# Patient Record
Sex: FEMALE | Race: BLACK OR AFRICAN AMERICAN | NOT HISPANIC OR LATINO
[De-identification: names, ages, dates, MRNs, and addresses within clinical notes are randomized per-mention and may not be internally consistent; named-entity substitution may affect disease eponyms.]

---

## 2019-10-25 ENCOUNTER — TRANSCRIPTION ENCOUNTER (OUTPATIENT)
Age: 29
End: 2019-10-25

## 2020-01-02 ENCOUNTER — TRANSCRIPTION ENCOUNTER (OUTPATIENT)
Age: 30
End: 2020-01-02

## 2020-05-10 ENCOUNTER — RESULT REVIEW (OUTPATIENT)
Age: 30
End: 2020-05-10

## 2020-05-10 ENCOUNTER — TRANSCRIPTION ENCOUNTER (OUTPATIENT)
Age: 30
End: 2020-05-10

## 2020-05-10 ENCOUNTER — INPATIENT (INPATIENT)
Facility: HOSPITAL | Age: 30
LOS: 0 days | Discharge: HOME | End: 2020-05-11
Attending: OBSTETRICS & GYNECOLOGY | Admitting: OBSTETRICS & GYNECOLOGY
Payer: COMMERCIAL

## 2020-05-10 VITALS
RESPIRATION RATE: 16 BRPM | DIASTOLIC BLOOD PRESSURE: 81 MMHG | SYSTOLIC BLOOD PRESSURE: 141 MMHG | HEART RATE: 97 BPM | TEMPERATURE: 98 F

## 2020-05-10 DIAGNOSIS — Z3A.00 WEEKS OF GESTATION OF PREGNANCY NOT SPECIFIED: ICD-10-CM

## 2020-05-10 DIAGNOSIS — O26.899 OTHER SPECIFIED PREGNANCY RELATED CONDITIONS, UNSPECIFIED TRIMESTER: ICD-10-CM

## 2020-05-10 DIAGNOSIS — Z98.890 OTHER SPECIFIED POSTPROCEDURAL STATES: Chronic | ICD-10-CM

## 2020-05-10 LAB
ALBUMIN SERPL ELPH-MCNC: 3.9 G/DL — SIGNIFICANT CHANGE UP (ref 3.3–5)
ALP SERPL-CCNC: 60 U/L — SIGNIFICANT CHANGE UP (ref 40–120)
ALT FLD-CCNC: 28 U/L — SIGNIFICANT CHANGE UP (ref 4–33)
ANION GAP SERPL CALC-SCNC: 17 MMO/L — HIGH (ref 7–14)
AST SERPL-CCNC: 21 U/L — SIGNIFICANT CHANGE UP (ref 4–32)
BASOPHILS # BLD AUTO: 0.04 K/UL — SIGNIFICANT CHANGE UP (ref 0–0.2)
BASOPHILS NFR BLD AUTO: 0.2 % — SIGNIFICANT CHANGE UP (ref 0–2)
BILIRUB SERPL-MCNC: < 0.2 MG/DL — LOW (ref 0.2–1.2)
BLD GP AB SCN SERPL QL: NEGATIVE — SIGNIFICANT CHANGE UP
BUN SERPL-MCNC: 6 MG/DL — LOW (ref 7–23)
CALCIUM SERPL-MCNC: 9.9 MG/DL — SIGNIFICANT CHANGE UP (ref 8.4–10.5)
CHLORIDE SERPL-SCNC: 103 MMOL/L — SIGNIFICANT CHANGE UP (ref 98–107)
CO2 SERPL-SCNC: 19 MMOL/L — LOW (ref 22–31)
CREAT ?TM UR-MCNC: 97.7 MG/DL — SIGNIFICANT CHANGE UP
CREAT SERPL-MCNC: 0.79 MG/DL — SIGNIFICANT CHANGE UP (ref 0.5–1.3)
EOSINOPHIL # BLD AUTO: 0.21 K/UL — SIGNIFICANT CHANGE UP (ref 0–0.5)
EOSINOPHIL NFR BLD AUTO: 1.2 % — SIGNIFICANT CHANGE UP (ref 0–6)
FIBRINOGEN PPP-MCNC: 675 MG/DL — HIGH (ref 300–520)
GLUCOSE SERPL-MCNC: 106 MG/DL — HIGH (ref 70–99)
HCT VFR BLD CALC: 40.5 % — SIGNIFICANT CHANGE UP (ref 34.5–45)
HGB BLD-MCNC: 13.9 G/DL — SIGNIFICANT CHANGE UP (ref 11.5–15.5)
IMM GRANULOCYTES NFR BLD AUTO: 0.5 % — SIGNIFICANT CHANGE UP (ref 0–1.5)
LDH SERPL L TO P-CCNC: 144 U/L — SIGNIFICANT CHANGE UP (ref 135–225)
LYMPHOCYTES # BLD AUTO: 1.89 K/UL — SIGNIFICANT CHANGE UP (ref 1–3.3)
LYMPHOCYTES # BLD AUTO: 11 % — LOW (ref 13–44)
MCHC RBC-ENTMCNC: 27 PG — SIGNIFICANT CHANGE UP (ref 27–34)
MCHC RBC-ENTMCNC: 34.3 % — SIGNIFICANT CHANGE UP (ref 32–36)
MCV RBC AUTO: 78.6 FL — LOW (ref 80–100)
MONOCYTES # BLD AUTO: 1.08 K/UL — HIGH (ref 0–0.9)
MONOCYTES NFR BLD AUTO: 6.3 % — SIGNIFICANT CHANGE UP (ref 2–14)
NEUTROPHILS # BLD AUTO: 13.82 K/UL — HIGH (ref 1.8–7.4)
NEUTROPHILS NFR BLD AUTO: 80.8 % — HIGH (ref 43–77)
NRBC # FLD: 0 K/UL — SIGNIFICANT CHANGE UP (ref 0–0)
PLATELET # BLD AUTO: 355 K/UL — SIGNIFICANT CHANGE UP (ref 150–400)
PMV BLD: 9.4 FL — SIGNIFICANT CHANGE UP (ref 7–13)
POTASSIUM SERPL-MCNC: 3.7 MMOL/L — SIGNIFICANT CHANGE UP (ref 3.5–5.3)
POTASSIUM SERPL-SCNC: 3.7 MMOL/L — SIGNIFICANT CHANGE UP (ref 3.5–5.3)
PROT SERPL-MCNC: 7.4 G/DL — SIGNIFICANT CHANGE UP (ref 6–8.3)
PROT UR-MCNC: 12.7 MG/DL — SIGNIFICANT CHANGE UP
RBC # BLD: 5.15 M/UL — SIGNIFICANT CHANGE UP (ref 3.8–5.2)
RBC # FLD: 13.8 % — SIGNIFICANT CHANGE UP (ref 10.3–14.5)
RH IG SCN BLD-IMP: POSITIVE — SIGNIFICANT CHANGE UP
RH IG SCN BLD-IMP: POSITIVE — SIGNIFICANT CHANGE UP
SODIUM SERPL-SCNC: 139 MMOL/L — SIGNIFICANT CHANGE UP (ref 135–145)
URATE SERPL-MCNC: 4.1 MG/DL — SIGNIFICANT CHANGE UP (ref 2.5–7)
WBC # BLD: 17.13 K/UL — HIGH (ref 3.8–10.5)
WBC # FLD AUTO: 17.13 K/UL — HIGH (ref 3.8–10.5)

## 2020-05-10 RX ORDER — SODIUM CHLORIDE 9 MG/ML
1000 INJECTION, SOLUTION INTRAVENOUS
Refills: 0 | Status: DISCONTINUED | OUTPATIENT
Start: 2020-05-10 | End: 2020-05-11

## 2020-05-10 RX ORDER — OXYTOCIN 10 UNIT/ML
333.33 VIAL (ML) INJECTION
Qty: 20 | Refills: 0 | Status: DISCONTINUED | OUTPATIENT
Start: 2020-05-10 | End: 2020-05-10

## 2020-05-10 RX ORDER — INFLUENZA VIRUS VACCINE 15; 15; 15; 15 UG/.5ML; UG/.5ML; UG/.5ML; UG/.5ML
0.5 SUSPENSION INTRAMUSCULAR ONCE
Refills: 0 | Status: DISCONTINUED | OUTPATIENT
Start: 2020-05-10 | End: 2020-05-11

## 2020-05-10 NOTE — OB PROVIDER TRIAGE NOTE - NSOBPROVIDERNOTE_OBGYN_ALL_OB_FT
Dr. Rosado's pt. is a 29y/o  EGA 19.2wks reports of spotting of bright red blood since 1130am and irregular abdominal cramping. Pt. states around 1830pm she noticed an increase in bright red blood. Pt. states she had intercourse 48hrs ago.     AP: Denies  Blood Type: O Neg.   Medical Hx: Denies  Surgical Hx: D&C 2019  OBGYN Hx:   TOP 2019 with D&C  Meds: PNV  NKDA    Assessment/Plan  BP: 141/81, HR: 97, Temp: 36.9  Abdomen soft nontender  TAS: Cephalic presentation, anterior placenta, FHR:165bpm, MVP:4.9, EFW:299.49gms  Speculum: Bulging membranes 2-3cm dilated   No contractions on TOCO    Discussed findings with Dr. Rawls and Dr. Johnston  -Admit to L&D  -Routine labs, HELLP, TORCH panel, COVID   -Plan for induction

## 2020-05-10 NOTE — CHART NOTE - NSCHARTNOTEFT_GEN_A_CORE
[facility] OB Chart Note    Vaginal cytotec 400mcg buccal + 400mcg vaginal placed.  SVE 3/50/-3, bulging membranes in vagina.    Vital Signs Last 24 Hours  T(C): 37 (05-10-20 @ 20:48), Max: 37 (05-10-20 @ 20:48)  HR: 80 (05-10-20 @ 23:00) (80 - 108)  BP: 133/72 (05-10-20 @ 23:00) (129/75 - 142/87)  RR: 18 (05-10-20 @ 20:48) (16 - 18)      Labs:                        13.9   17.13 )-----------( 355      ( 10 May 2020 21:40 )             40.5   baso 0.2    eos 1.2    imm gran 0.5    lymph 11.0   mono 6.3    poly 80.8     05-10    139  |  103  |  6<L>  ----------------------------<  106<H>  3.7   |  19<L>  |  0.79    Ca    9.9      10 May 2020 21:40    TPro  7.4  /  Alb  3.9  /  TBili  < 0.2<L>  /  DBili  x   /  AST  21  /  ALT  28  /  AlkPhos  60  05-10    Blood Type: O Positive    Plan   - hellp labs wnl, f/u urine P/C ratio   - leukocytosis with left shift, f/u TORCH labs, urine culture   - continue IOL w/ vaginal cytotec 400mcg vaginal q3h   - epi prn    d/w Dr. Curly Johnston MD PGY3

## 2020-05-10 NOTE — OB PROVIDER TRIAGE NOTE - NSHPPHYSICALEXAM_GEN_ALL_CORE
BP: 141/81, HR: 97, Temp: 36.9  Abdomen soft nontender  TAS: Cephalic presentation, anterior placenta, FHR:165bpm, MVP:4.9, EFW:299.49gms  Speculum: Bulging membranes 2-3cm dilated   No contractions on TOCO

## 2020-05-10 NOTE — OB PROVIDER H&P - ASSESSMENT
Dr. Rosado's pt. is a 29y/o  EGA 19.2wks reports of spotting of bright red blood since 1130am and irregular abdominal cramping. Pt. states around 1830pm she noticed an increase in bright red blood. Pt. states she had intercourse 48hrs ago. Pt. states she has not felt fetal movement yet.      AP: Denies  Blood Type: O Neg. (as per pt)  Medical Hx: Denies  Surgical Hx: D&C 2019  OBGYN Hx:   TOP 2019 with D&C  Meds: PNV  NKDA    Assessment/Plan  BP: 141/81, HR: 97, Temp: 36.9  Abdomen soft nontender  TAS: Cephalic presentation, anterior placenta, FHR:165bpm, MVP:4.9, EFW:299.49gms  Speculum: Bulging membranes 2-3cm dilated   No contractions on TOCO    Discussed findings with Dr. Rawls and Dr. Johnston  -Admit to L&D  -Routine labs, HELLP, TORCH panel, COVID   -Plan for induction

## 2020-05-10 NOTE — OB PROVIDER TRIAGE NOTE - HISTORY OF PRESENT ILLNESS
Dr. Rosado's pt. is a 31y/o  EGA 19.2wks reports of spotting of bright red blood since 1130am and irregular abdominal cramping. Pt. states around 1830pm she noticed an increase in bright red blood. Pt. states she had intercourse 48hrs ago.

## 2020-05-10 NOTE — CHART NOTE - NSCHARTNOTEFT_GEN_A_CORE
[facility]3 OB Triage Chart Note    Pt seen and evaluated at bedside.  Per Triage PA pt p/w cramping and spotting throuhgout the day and found to have SSE w/ 2-3cm dilation and bulging membranes.  Pt denies any fevers, chills, N/V, abd pain, sick contacts.      ICU Vital Signs Last 24 Hrs  T(C): 36.9 (10 May 2020 20:04), Max: 36.9 (10 May 2020 19:40)  T(F): 98.42 (10 May 2020 20:04), Max: 98.42 (10 May 2020 20:04)  HR: 82 (10 May 2020 20:36) (82 - 108)  BP: 129/75 (10 May 2020 20:36) (129/75 - 142/87)  RR: 16 (10 May 2020 19:40) (16 - 16)    Gen nad, a&ox3  CV rrr  PULM ctab  ABD soft nt gravid  SVE deferred    A/P: 30y  @ 19w2d p/w cramping and VB found to be dilated 2-3cm w/ bulging membranes.  Pt elects to proceed with IOL.  -admit to L&D  -routine labs, T&S, HELLP labs for elevated mild range BP, COVID swab, TORCH labs  -Clears, LR@125  -anesthesia consult prn  -cytotec 400 buccal + 400 vaginal followed by 400 vaginal q3h for IOL  -venodynes for vte ppx    d/w Dr. Curly Johnston MD PGY3

## 2020-05-10 NOTE — OB PROVIDER H&P - ATTENDING COMMENTS
29y/o  EGA @ 19.2 weeks EGA c/o of spotting of bright red blood since 1130am and irregular abdominal cramping. Cervix dilated 2-3 cm by visual examination with bulging membranes. Dx: Inevitable . Discussed management options and patient elected induction of labor.

## 2020-05-10 NOTE — OB PROVIDER H&P - HISTORY OF PRESENT ILLNESS
Dr. Rosado's pt. is a 31y/o  EGA 19.2wks reports of spotting of bright red blood since 1130am and irregular abdominal cramping. Pain 5/10. Pt. states around 1830pm she noticed an increase in bright red blood. Pt. states she had intercourse 48hrs ago. Pt. states she has not felt fetal movement.

## 2020-05-11 VITALS
HEART RATE: 83 BPM | RESPIRATION RATE: 17 BRPM | TEMPERATURE: 99 F | SYSTOLIC BLOOD PRESSURE: 120 MMHG | DIASTOLIC BLOOD PRESSURE: 58 MMHG

## 2020-05-11 LAB
C TRACH RRNA SPEC QL NAA+PROBE: SIGNIFICANT CHANGE UP
HSV1 IGG SER-ACNC: 6.33 INDEX — HIGH
HSV1 IGG SERPL QL IA: POSITIVE — HIGH
HSV2 IGG FLD-ACNC: 0.14 INDEX — SIGNIFICANT CHANGE UP
HSV2 IGG SERPL QL IA: NEGATIVE — SIGNIFICANT CHANGE UP
N GONORRHOEA RRNA SPEC QL NAA+PROBE: SIGNIFICANT CHANGE UP
RBC # BLD FETUS AUTO: 0.1 — SIGNIFICANT CHANGE UP
RUBV IGG SER-ACNC: 2.1 INDEX — SIGNIFICANT CHANGE UP
RUBV IGG SER-IMP: POSITIVE — SIGNIFICANT CHANGE UP
SARS-COV-2 RNA SPEC QL NAA+PROBE: SIGNIFICANT CHANGE UP
T GONDII IGG SER QL: <3 IU/ML — SIGNIFICANT CHANGE UP
T GONDII IGG SER QL: NEGATIVE — SIGNIFICANT CHANGE UP
T GONDII IGM SER QL: <3 AU/ML — SIGNIFICANT CHANGE UP
T GONDII IGM SER QL: NEGATIVE — SIGNIFICANT CHANGE UP
T PALLIDUM AB TITR SER: NEGATIVE — SIGNIFICANT CHANGE UP

## 2020-05-11 PROCEDURE — 59840 INDUCED ABORTION D&C: CPT | Mod: GC

## 2020-05-11 PROCEDURE — 88305 TISSUE EXAM BY PATHOLOGIST: CPT | Mod: 26

## 2020-05-11 RX ORDER — DIPHENHYDRAMINE HCL 50 MG
25 CAPSULE ORAL EVERY 6 HOURS
Refills: 0 | Status: DISCONTINUED | OUTPATIENT
Start: 2020-05-11 | End: 2020-05-11

## 2020-05-11 RX ORDER — ERTAPENEM SODIUM 1 G/1
1000 INJECTION, POWDER, LYOPHILIZED, FOR SOLUTION INTRAMUSCULAR; INTRAVENOUS ONCE
Refills: 0 | Status: COMPLETED | OUTPATIENT
Start: 2020-05-11 | End: 2020-05-11

## 2020-05-11 RX ORDER — SODIUM CHLORIDE 9 MG/ML
3 INJECTION INTRAMUSCULAR; INTRAVENOUS; SUBCUTANEOUS EVERY 8 HOURS
Refills: 0 | Status: DISCONTINUED | OUTPATIENT
Start: 2020-05-11 | End: 2020-05-11

## 2020-05-11 RX ORDER — KETOROLAC TROMETHAMINE 30 MG/ML
30 SYRINGE (ML) INJECTION ONCE
Refills: 0 | Status: DISCONTINUED | OUTPATIENT
Start: 2020-05-11 | End: 2020-05-11

## 2020-05-11 RX ORDER — TETANUS TOXOID, REDUCED DIPHTHERIA TOXOID AND ACELLULAR PERTUSSIS VACCINE, ADSORBED 5; 2.5; 8; 8; 2.5 [IU]/.5ML; [IU]/.5ML; UG/.5ML; UG/.5ML; UG/.5ML
0.5 SUSPENSION INTRAMUSCULAR ONCE
Refills: 0 | Status: DISCONTINUED | OUTPATIENT
Start: 2020-05-11 | End: 2020-05-11

## 2020-05-11 RX ORDER — MORPHINE SULFATE 50 MG/1
4 CAPSULE, EXTENDED RELEASE ORAL ONCE
Refills: 0 | Status: DISCONTINUED | OUTPATIENT
Start: 2020-05-11 | End: 2020-05-11

## 2020-05-11 RX ORDER — HYDROCORTISONE 1 %
1 OINTMENT (GRAM) TOPICAL EVERY 6 HOURS
Refills: 0 | Status: DISCONTINUED | OUTPATIENT
Start: 2020-05-11 | End: 2020-05-11

## 2020-05-11 RX ORDER — BENZOCAINE 10 %
1 GEL (GRAM) MUCOUS MEMBRANE EVERY 6 HOURS
Refills: 0 | Status: DISCONTINUED | OUTPATIENT
Start: 2020-05-11 | End: 2020-05-11

## 2020-05-11 RX ORDER — AER TRAVELER 0.5 G/1
1 SOLUTION RECTAL; TOPICAL EVERY 4 HOURS
Refills: 0 | Status: DISCONTINUED | OUTPATIENT
Start: 2020-05-11 | End: 2020-05-11

## 2020-05-11 RX ORDER — OXYCODONE HYDROCHLORIDE 5 MG/1
5 TABLET ORAL ONCE
Refills: 0 | Status: DISCONTINUED | OUTPATIENT
Start: 2020-05-11 | End: 2020-05-11

## 2020-05-11 RX ORDER — MAGNESIUM HYDROXIDE 400 MG/1
30 TABLET, CHEWABLE ORAL
Refills: 0 | Status: DISCONTINUED | OUTPATIENT
Start: 2020-05-11 | End: 2020-05-11

## 2020-05-11 RX ORDER — IBUPROFEN 200 MG
600 TABLET ORAL EVERY 6 HOURS
Refills: 0 | Status: DISCONTINUED | OUTPATIENT
Start: 2020-05-11 | End: 2020-05-11

## 2020-05-11 RX ORDER — LANOLIN
1 OINTMENT (GRAM) TOPICAL EVERY 6 HOURS
Refills: 0 | Status: DISCONTINUED | OUTPATIENT
Start: 2020-05-11 | End: 2020-05-11

## 2020-05-11 RX ORDER — SIMETHICONE 80 MG/1
80 TABLET, CHEWABLE ORAL EVERY 4 HOURS
Refills: 0 | Status: DISCONTINUED | OUTPATIENT
Start: 2020-05-11 | End: 2020-05-11

## 2020-05-11 RX ORDER — PRAMOXINE HYDROCHLORIDE 150 MG/15G
1 AEROSOL, FOAM RECTAL EVERY 4 HOURS
Refills: 0 | Status: DISCONTINUED | OUTPATIENT
Start: 2020-05-11 | End: 2020-05-11

## 2020-05-11 RX ORDER — DIBUCAINE 1 %
1 OINTMENT (GRAM) RECTAL EVERY 6 HOURS
Refills: 0 | Status: DISCONTINUED | OUTPATIENT
Start: 2020-05-11 | End: 2020-05-11

## 2020-05-11 RX ORDER — OXYTOCIN 10 UNIT/ML
333.33 VIAL (ML) INJECTION
Qty: 20 | Refills: 0 | Status: DISCONTINUED | OUTPATIENT
Start: 2020-05-11 | End: 2020-05-11

## 2020-05-11 RX ORDER — DIPHENOXYLATE HCL/ATROPINE 2.5-.025MG
2 TABLET ORAL ONCE
Refills: 0 | Status: DISCONTINUED | OUTPATIENT
Start: 2020-05-11 | End: 2020-05-11

## 2020-05-11 RX ORDER — CARBOPROST TROMETHAMINE 250 UG/ML
250 INJECTION, SOLUTION INTRAMUSCULAR
Refills: 0 | Status: COMPLETED | OUTPATIENT
Start: 2020-05-11 | End: 2020-05-11

## 2020-05-11 RX ORDER — OXYCODONE HYDROCHLORIDE 5 MG/1
5 TABLET ORAL
Refills: 0 | Status: DISCONTINUED | OUTPATIENT
Start: 2020-05-11 | End: 2020-05-11

## 2020-05-11 RX ORDER — ACETAMINOPHEN 500 MG
975 TABLET ORAL
Refills: 0 | Status: DISCONTINUED | OUTPATIENT
Start: 2020-05-11 | End: 2020-05-11

## 2020-05-11 RX ADMIN — MORPHINE SULFATE 4 MILLIGRAM(S): 50 CAPSULE, EXTENDED RELEASE ORAL at 00:25

## 2020-05-11 RX ADMIN — Medication 1000 MILLIUNIT(S)/MIN: at 02:20

## 2020-05-11 RX ADMIN — Medication 2 TABLET(S): at 01:50

## 2020-05-11 RX ADMIN — SODIUM CHLORIDE 3 MILLILITER(S): 9 INJECTION INTRAMUSCULAR; INTRAVENOUS; SUBCUTANEOUS at 06:36

## 2020-05-11 RX ADMIN — CARBOPROST TROMETHAMINE 250 MICROGRAM(S): 250 INJECTION, SOLUTION INTRAMUSCULAR at 02:25

## 2020-05-11 RX ADMIN — CARBOPROST TROMETHAMINE 250 MICROGRAM(S): 250 INJECTION, SOLUTION INTRAMUSCULAR at 01:50

## 2020-05-11 RX ADMIN — Medication 30 MILLIGRAM(S): at 03:00

## 2020-05-11 RX ADMIN — CARBOPROST TROMETHAMINE 250 MICROGRAM(S): 250 INJECTION, SOLUTION INTRAMUSCULAR at 02:07

## 2020-05-11 RX ADMIN — ERTAPENEM SODIUM 120 MILLIGRAM(S): 1 INJECTION, POWDER, LYOPHILIZED, FOR SOLUTION INTRAMUSCULAR; INTRAVENOUS at 02:56

## 2020-05-11 NOTE — DISCHARGE NOTE OB - PROVIDER TOKENS
FREE:[LAST:[[facility]],PHONE:[phone]],FAX:[(   )    -],ADDRESS:[[address] [place] [address] [address], [place] [address].]]

## 2020-05-11 NOTE — DISCHARGE NOTE OB - HOSPITAL COURSE
Pt was admitted with cramping and vaginal bleeding and found to have bulging membranes with cervix dilated 2-3cm and leukocytosis.  Pt underwent cytotec induction of labor and had spontaneous delivery of nonviable male infant.  She received 1g IV invanz for manual extraction of placenta.  EBL 150cc.  Placental cultures as well as TORCH labs were sent and placenta was sent to pathology.      On the day of discharge the patient was stable and afebrile, voiding spontaneously, tolerating regular diet, ambulating at baseline and had pain well controlled with oral pain medications.  Patient to have close follow up with [facility] outpatient. Pt was admitted with cramping and vaginal bleeding and found to have bulging membranes with cervix dilated 2-3cm and leukocytosis.  Pt underwent cytotec induction of labor and had spontaneous delivery of nonviable male infant.  She received 1g IV invanz for manual extraction of placenta.  EBL 150cc.  Placental cultures as well as TORCH labs were sent and placenta was sent to pathology.  During her admission the pt had mild-range BP w/o ssx of PEC, concerning for previously undiagnosed chronic hypertension.  HELLP labs were wnl and P/C = 0.1.  Pt was advised to follow up with her PCP w/in 1 week for monitoring and treatment of chronic hypertension.  On the day of discharge the patient was stable and afebrile, voiding spontaneously, tolerating regular diet, ambulating at baseline and had pain well controlled with oral pain medications.  Patient to have close follow up with [facility] outpatient.

## 2020-05-11 NOTE — OB PROVIDER DELIVERY SUMMARY - NSPROVIDERDELIVERYNOTE_OBGYN_ALL_OB_FT
.  .  Attending Attestation:  I was involved in the management of the patient. I discussed the case with the resident and agree with the findings and plan as documented in the resident’s note.  Martin Rawls M.D.  of nonviable male infant.  Placenta delivered spontaneously, retained membranes was suspected and hemabate 250mg IM x3 was given and bimanual exam revealed no retained products of conception.  Bedside sonogram showed thin endometrial stripe and no color doppler flow within endometrium.  No lacerations.  .  Count correct.    L Preston FIGUEROA PGY3      Attending Attestation:  I was involved in the management of the patient. I discussed the case with the resident and agree with the findings and plan as documented in the resident’s note.  Martin Rawls M.D.

## 2020-05-11 NOTE — DISCHARGE NOTE OB - CARE PROVIDER_API CALL
[facility],   [address], [place] [address].  Phone: [phone]  Fax: (   )    -  Follow Up Time:

## 2020-05-11 NOTE — DISCHARGE NOTE OB - ADDITIONAL INSTRUCTIONS
Follow up with [facility] at [address], [place] [address].  Call the office at [phone] for your appointment. Follow up with [facility] at [address] [facility] [facility] [address], [place] [address].  Call the office at [phone] for your appointment.    Follow up with your primary care doctor within 1 week for monitoring and treatment of high blood pressures.

## 2020-05-11 NOTE — CHART NOTE - NSCHARTNOTEFT_GEN_A_CORE
31 yo  PPD#0 s/ VD of IUFD at 30vvc7s seen and evaluated at bedside. Pt reports she is feeling well.  Tolerating PO intake. Ambulating and voiding spontaneously.  Describes mild cramping but no pain.      VS  T(C): 36.9 (20 @ 06:05)  HR: 76 (20 @ 06:30)  BP: 106/53 (20 @ 06:30)  RR: 17 (20 @ 06:30)  SpO2: --    Gen: NAD  Abd: soft, non-tender, firm uterus  FU: mild bleeding on pad    31 yo  PPD#0 s/p VD of IUFD, patient is stable and doing well.  -increase ambulation  -pt counseled on what to expect as far as pain, bleeding, and follow up  -placental pathology to be followed up   -discharge planning    Darlene Galloway PGY1

## 2020-05-11 NOTE — DISCHARGE NOTE OB - CARE PLAN
Principal Discharge DX:	Stillbirth with normal delivery  Goal:	Wellness  Assessment and plan of treatment:	1. Nothing in the vagina for 6 weeks postpartum including no tampons, no douching, no tub baths and no intercourse.  2. Please call the office or go to the Emergency Room if you have any of the following: fever over 100.4F, pain not controlled by oral pain medications, difficulty urinating, severe vaginal bleeding more than 1 pad per hour, or for any other concerns.  3.  Follow up with your OBGYN in 6 weeks for postop checkup.  Call the office for your appointment. Principal Discharge DX:	Stillbirth with normal delivery  Goal:	Wellness  Assessment and plan of treatment:	1. Nothing in the vagina for 6 weeks postpartum including no tampons, no douching, no tub baths and no intercourse.  2. Please call the office or go to the Emergency Room if you have any of the following: fever over 100.4F, pain not controlled by oral pain medications, difficulty urinating, severe vaginal bleeding more than 1 pad per hour, or for any other concerns.  3.  Follow up with your OBGYN in 6 weeks for postop checkup.  Call the office for your appointment.  Secondary Diagnosis:	Hypertension affecting pregnancy in second trimester  Goal:	Blood pressure control.  Assessment and plan of treatment:	1.  Your blood pressures were mildly elevated during your hospital stay.  It is important to follow up with your Primary Care Provider within 1 week for monitoring of your blood pressure.    2.  Please purchase a blood pressure cuff and check your blood pressure twice daily.  If you blood pressure exceeds 150/100, or if you have severe headaches, visual changes, shortness of breath, chest pain, or upper abdominal pain, call your OBGYN or your Primary Care Provider or go to the Emergency Room.

## 2020-05-11 NOTE — DISCHARGE NOTE OB - PATIENT PORTAL LINK FT
You can access the FollowMyHealth Patient Portal offered by [facility] by registering at the following website: [email or web address]. By joining [employer]’s FollowMyHealth portal, you will also be able to view your health information using other applications (apps) compatible with our system.

## 2020-05-11 NOTE — CHART NOTE - NSCHARTNOTEFT_GEN_A_CORE
[facility] OB Chart Note    Demise paperwork filled out with pt.  She does not want cytogenetics or autopsy performed; she desires hospital burial of remains.      Pt feeling increased rectal pressure/pain.  SROM w/ clear fluids.  Requesting pain meds.  SVE 4-5/80/-1    Plan  -morphine 4x4  -continue IOL w/ cytotec    d/w Dr. Curly Johnston MD PGY3

## 2020-05-11 NOTE — DISCHARGE NOTE OB - PLAN OF CARE
Wellness 1. Nothing in the vagina for 6 weeks postpartum including no tampons, no douching, no tub baths and no intercourse.  2. Please call the office or go to the Emergency Room if you have any of the following: fever over 100.4F, pain not controlled by oral pain medications, difficulty urinating, severe vaginal bleeding more than 1 pad per hour, or for any other concerns.  3.  Follow up with your OBGYN in 6 weeks for postop checkup.  Call the office for your appointment. Blood pressure control. 1.  Your blood pressures were mildly elevated during your hospital stay.  It is important to follow up with your Primary Care Provider within 1 week for monitoring of your blood pressure.    2.  Please purchase a blood pressure cuff and check your blood pressure twice daily.  If you blood pressure exceeds 150/100, or if you have severe headaches, visual changes, shortness of breath, chest pain, or upper abdominal pain, call your OBGYN or your Primary Care Provider or go to the Emergency Room.

## 2020-05-11 NOTE — DISCHARGE NOTE OB - MEDICATION SUMMARY - MEDICATIONS TO TAKE
I will START or STAY ON the medications listed below when I get home from the hospital:    Tylenol Extra Strength 500 mg oral tablet  -- 2 tab(s) by mouth every 6 hours  -- Indication: For pain    ibuprofen 200 mg oral tablet  -- 3 tab(s) by mouth every 6 hours  -- Indication: For pain

## 2020-05-12 LAB — MEV IGM SER-ACNC: <20 AU/ML — SIGNIFICANT CHANGE UP (ref 0–19.9)

## 2020-05-13 LAB
HSV1 AB FLD QL: SIGNIFICANT CHANGE UP TITER
HSV2 AB FLD-ACNC: SIGNIFICANT CHANGE UP TITER

## 2020-05-16 LAB
CULTURE RESULTS: SIGNIFICANT CHANGE UP
CULTURE RESULTS: SIGNIFICANT CHANGE UP
SPECIMEN SOURCE: SIGNIFICANT CHANGE UP
SPECIMEN SOURCE: SIGNIFICANT CHANGE UP

## 2020-05-26 NOTE — OB RN TRIAGE NOTE - SUICIDE SCREENING QUESTION 1
Spoke with pt via phone.     Pt calling to schedule follow up to discuss neuropsychology results. Results not available yet. Message was sent on to Neuropsychology on 5/22/20 that pt calling and checking on status.     Will watch for results.    No

## 2020-07-23 NOTE — OB RN TRIAGE NOTE - NS PRO TALK SOMEONE YN
enalapril 20 mg oral tablet: 1 tab(s) orally once a day  enalapril 20 mg oral tablet: 1 tab(s) orally once a day metoprolol succinate 25 mg oral tablet, extended release: 1 tab(s) orally once a day metoprolol succinate 25 mg oral tablet, extended release: 1 tab(s) orally once a day  ondansetron 4 mg oral tablet: 1 tab(s) orally 3 times a day, As needed, Nausea  oxycodone-acetaminophen 5 mg-325 mg oral tablet: 0.5 tab(s) orally every 6 hours, As Needed for pain MDD:2 tablets no

## 2020-11-15 ENCOUNTER — INPATIENT (INPATIENT)
Facility: HOSPITAL | Age: 30
LOS: 0 days | Discharge: HOME | End: 2020-11-16
Attending: OBSTETRICS & GYNECOLOGY | Admitting: OBSTETRICS & GYNECOLOGY
Payer: COMMERCIAL

## 2020-11-15 ENCOUNTER — EMERGENCY (EMERGENCY)
Facility: HOSPITAL | Age: 30
LOS: 1 days | Discharge: STILL ADMITTED OR ADMINISTRATIVE | End: 2020-11-15
Admitting: EMERGENCY MEDICINE

## 2020-11-15 ENCOUNTER — TRANSCRIPTION ENCOUNTER (OUTPATIENT)
Age: 30
End: 2020-11-15

## 2020-11-15 ENCOUNTER — RESULT REVIEW (OUTPATIENT)
Age: 30
End: 2020-11-15

## 2020-11-15 VITALS — HEIGHT: 63 IN | WEIGHT: 156.97 LBS

## 2020-11-15 VITALS
HEIGHT: 63 IN | OXYGEN SATURATION: 100 % | RESPIRATION RATE: 18 BRPM | TEMPERATURE: 98 F | DIASTOLIC BLOOD PRESSURE: 87 MMHG | SYSTOLIC BLOOD PRESSURE: 142 MMHG | HEART RATE: 86 BPM

## 2020-11-15 DIAGNOSIS — Z98.890 OTHER SPECIFIED POSTPROCEDURAL STATES: Chronic | ICD-10-CM

## 2020-11-15 DIAGNOSIS — O34.32 MATERNAL CARE FOR CERVICAL INCOMPETENCE, SECOND TRIMESTER: ICD-10-CM

## 2020-11-15 DIAGNOSIS — O26.899 OTHER SPECIFIED PREGNANCY RELATED CONDITIONS, UNSPECIFIED TRIMESTER: ICD-10-CM

## 2020-11-15 DIAGNOSIS — O34.30 MATERNAL CARE FOR CERVICAL INCOMPETENCE, UNSPECIFIED TRIMESTER: Chronic | ICD-10-CM

## 2020-11-15 DIAGNOSIS — Z3A.00 WEEKS OF GESTATION OF PREGNANCY NOT SPECIFIED: ICD-10-CM

## 2020-11-15 LAB
APPEARANCE UR: CLEAR — SIGNIFICANT CHANGE UP
APPEARANCE UR: CLEAR — SIGNIFICANT CHANGE UP
APTT BLD: 37.1 SEC — HIGH (ref 27–36.3)
BACTERIA # UR AUTO: NEGATIVE — SIGNIFICANT CHANGE UP
BACTERIA # UR AUTO: NEGATIVE — SIGNIFICANT CHANGE UP
BASOPHILS # BLD AUTO: 0.04 K/UL — SIGNIFICANT CHANGE UP (ref 0–0.2)
BASOPHILS NFR BLD AUTO: 0.3 % — SIGNIFICANT CHANGE UP (ref 0–2)
BILIRUB UR-MCNC: NEGATIVE — SIGNIFICANT CHANGE UP
BILIRUB UR-MCNC: NEGATIVE — SIGNIFICANT CHANGE UP
BLOOD UR QL VISUAL: SIGNIFICANT CHANGE UP
BLOOD UR QL VISUAL: SIGNIFICANT CHANGE UP
COLOR SPEC: COLORLESS — SIGNIFICANT CHANGE UP
COLOR SPEC: COLORLESS — SIGNIFICANT CHANGE UP
EOSINOPHIL # BLD AUTO: 0.1 K/UL — SIGNIFICANT CHANGE UP (ref 0–0.5)
EOSINOPHIL NFR BLD AUTO: 0.6 % — SIGNIFICANT CHANGE UP (ref 0–6)
GLUCOSE UR-MCNC: NEGATIVE — SIGNIFICANT CHANGE UP
GLUCOSE UR-MCNC: NEGATIVE — SIGNIFICANT CHANGE UP
HCT VFR BLD CALC: 44.2 % — SIGNIFICANT CHANGE UP (ref 34.5–45)
HGB BLD-MCNC: 14.6 G/DL — SIGNIFICANT CHANGE UP (ref 11.5–15.5)
HYALINE CASTS # UR AUTO: NEGATIVE — SIGNIFICANT CHANGE UP
HYALINE CASTS # UR AUTO: NEGATIVE — SIGNIFICANT CHANGE UP
IMM GRANULOCYTES NFR BLD AUTO: 0.4 % — SIGNIFICANT CHANGE UP (ref 0–1.5)
INR BLD: 1.18 — HIGH (ref 0.88–1.16)
KETONES UR-MCNC: NEGATIVE — SIGNIFICANT CHANGE UP
KETONES UR-MCNC: NEGATIVE — SIGNIFICANT CHANGE UP
LEUKOCYTE ESTERASE UR-ACNC: NEGATIVE — SIGNIFICANT CHANGE UP
LEUKOCYTE ESTERASE UR-ACNC: NEGATIVE — SIGNIFICANT CHANGE UP
LYMPHOCYTES # BLD AUTO: 1.81 K/UL — SIGNIFICANT CHANGE UP (ref 1–3.3)
LYMPHOCYTES # BLD AUTO: 11.5 % — LOW (ref 13–44)
MCHC RBC-ENTMCNC: 26.6 PG — LOW (ref 27–34)
MCHC RBC-ENTMCNC: 33 % — SIGNIFICANT CHANGE UP (ref 32–36)
MCV RBC AUTO: 80.5 FL — SIGNIFICANT CHANGE UP (ref 80–100)
MONOCYTES # BLD AUTO: 0.84 K/UL — SIGNIFICANT CHANGE UP (ref 0–0.9)
MONOCYTES NFR BLD AUTO: 5.4 % — SIGNIFICANT CHANGE UP (ref 2–14)
NEUTROPHILS # BLD AUTO: 12.84 K/UL — HIGH (ref 1.8–7.4)
NEUTROPHILS NFR BLD AUTO: 81.8 % — HIGH (ref 43–77)
NITRITE UR-MCNC: NEGATIVE — SIGNIFICANT CHANGE UP
NITRITE UR-MCNC: NEGATIVE — SIGNIFICANT CHANGE UP
NRBC # FLD: 0 K/UL — SIGNIFICANT CHANGE UP (ref 0–0)
PH UR: 6.5 — SIGNIFICANT CHANGE UP (ref 5–8)
PH UR: 6.5 — SIGNIFICANT CHANGE UP (ref 5–8)
PLATELET # BLD AUTO: 329 K/UL — SIGNIFICANT CHANGE UP (ref 150–400)
PMV BLD: 9.3 FL — SIGNIFICANT CHANGE UP (ref 7–13)
PROT UR-MCNC: NEGATIVE — SIGNIFICANT CHANGE UP
PROT UR-MCNC: NEGATIVE — SIGNIFICANT CHANGE UP
PROTHROM AB SERPL-ACNC: 13.3 SEC — SIGNIFICANT CHANGE UP (ref 10.6–13.6)
RBC # BLD: 5.49 M/UL — HIGH (ref 3.8–5.2)
RBC # FLD: 14 % — SIGNIFICANT CHANGE UP (ref 10.3–14.5)
RBC CASTS # UR COMP ASSIST: SIGNIFICANT CHANGE UP (ref 0–?)
RBC CASTS # UR COMP ASSIST: SIGNIFICANT CHANGE UP (ref 0–?)
SARS-COV-2 RNA SPEC QL NAA+PROBE: SIGNIFICANT CHANGE UP
SP GR SPEC: 1.01 — SIGNIFICANT CHANGE UP (ref 1–1.04)
SP GR SPEC: 1.01 — SIGNIFICANT CHANGE UP (ref 1–1.04)
SQUAMOUS # UR AUTO: SIGNIFICANT CHANGE UP
SQUAMOUS # UR AUTO: SIGNIFICANT CHANGE UP
UROBILINOGEN FLD QL: NORMAL — SIGNIFICANT CHANGE UP
UROBILINOGEN FLD QL: NORMAL — SIGNIFICANT CHANGE UP
WBC # BLD: 15.69 K/UL — HIGH (ref 3.8–10.5)
WBC # FLD AUTO: 15.69 K/UL — HIGH (ref 3.8–10.5)
WBC UR QL: SIGNIFICANT CHANGE UP (ref 0–?)
WBC UR QL: SIGNIFICANT CHANGE UP (ref 0–?)

## 2020-11-15 RX ORDER — SODIUM CHLORIDE 9 MG/ML
1000 INJECTION, SOLUTION INTRAVENOUS
Refills: 0 | Status: DISCONTINUED | OUTPATIENT
Start: 2020-11-15 | End: 2020-11-16

## 2020-11-15 RX ORDER — CARBOPROST TROMETHAMINE 250 UG/ML
250 INJECTION, SOLUTION INTRAMUSCULAR
Refills: 0 | Status: COMPLETED | OUTPATIENT
Start: 2020-11-15 | End: 2020-11-15

## 2020-11-15 RX ORDER — ERTAPENEM SODIUM 1 G/1
1000 INJECTION, POWDER, LYOPHILIZED, FOR SOLUTION INTRAMUSCULAR; INTRAVENOUS ONCE
Refills: 0 | Status: COMPLETED | OUTPATIENT
Start: 2020-11-15 | End: 2020-11-15

## 2020-11-15 RX ORDER — ACETAMINOPHEN 500 MG
2 TABLET ORAL
Qty: 0 | Refills: 0 | DISCHARGE

## 2020-11-15 RX ORDER — ERTAPENEM SODIUM 1 G/1
INJECTION, POWDER, LYOPHILIZED, FOR SOLUTION INTRAMUSCULAR; INTRAVENOUS
Refills: 0 | Status: DISCONTINUED | OUTPATIENT
Start: 2020-11-15 | End: 2020-11-16

## 2020-11-15 RX ORDER — INFLUENZA VIRUS VACCINE 15; 15; 15; 15 UG/.5ML; UG/.5ML; UG/.5ML; UG/.5ML
0.5 SUSPENSION INTRAMUSCULAR ONCE
Refills: 0 | Status: DISCONTINUED | OUTPATIENT
Start: 2020-11-15 | End: 2020-11-16

## 2020-11-15 RX ORDER — IBUPROFEN 200 MG
3 TABLET ORAL
Qty: 0 | Refills: 0 | DISCHARGE

## 2020-11-15 RX ORDER — MORPHINE SULFATE 50 MG/1
4 CAPSULE, EXTENDED RELEASE ORAL ONCE
Refills: 0 | Status: DISCONTINUED | OUTPATIENT
Start: 2020-11-15 | End: 2020-11-15

## 2020-11-15 RX ORDER — MORPHINE SULFATE 50 MG/1
2 CAPSULE, EXTENDED RELEASE ORAL ONCE
Refills: 0 | Status: DISCONTINUED | OUTPATIENT
Start: 2020-11-15 | End: 2020-11-16

## 2020-11-15 RX ORDER — ERTAPENEM SODIUM 1 G/1
1000 INJECTION, POWDER, LYOPHILIZED, FOR SOLUTION INTRAMUSCULAR; INTRAVENOUS ONCE
Refills: 0 | Status: DISCONTINUED | OUTPATIENT
Start: 2020-11-15 | End: 2020-11-15

## 2020-11-15 RX ORDER — ERTAPENEM SODIUM 1 G/1
1000 INJECTION, POWDER, LYOPHILIZED, FOR SOLUTION INTRAMUSCULAR; INTRAVENOUS EVERY 24 HOURS
Refills: 0 | Status: DISCONTINUED | OUTPATIENT
Start: 2020-11-16 | End: 2020-11-16

## 2020-11-15 RX ORDER — ERTAPENEM SODIUM 1 G/1
INJECTION, POWDER, LYOPHILIZED, FOR SOLUTION INTRAMUSCULAR; INTRAVENOUS
Refills: 0 | Status: DISCONTINUED | OUTPATIENT
Start: 2020-11-15 | End: 2020-11-15

## 2020-11-15 RX ORDER — MIFEPRISTONE 300 MG/1
200 TABLET, FILM COATED ORAL ONCE
Refills: 0 | Status: DISCONTINUED | OUTPATIENT
Start: 2020-11-15 | End: 2020-11-16

## 2020-11-15 RX ADMIN — MORPHINE SULFATE 4 MILLIGRAM(S): 50 CAPSULE, EXTENDED RELEASE ORAL at 22:09

## 2020-11-15 RX ADMIN — CARBOPROST TROMETHAMINE 250 MICROGRAM(S): 250 INJECTION, SOLUTION INTRAMUSCULAR at 22:45

## 2020-11-15 RX ADMIN — ERTAPENEM SODIUM 120 MILLIGRAM(S): 1 INJECTION, POWDER, LYOPHILIZED, FOR SOLUTION INTRAMUSCULAR; INTRAVENOUS at 20:35

## 2020-11-15 RX ADMIN — MORPHINE SULFATE 4 MILLIGRAM(S): 50 CAPSULE, EXTENDED RELEASE ORAL at 22:06

## 2020-11-15 RX ADMIN — CARBOPROST TROMETHAMINE 250 MICROGRAM(S): 250 INJECTION, SOLUTION INTRAMUSCULAR at 23:01

## 2020-11-15 RX ADMIN — CARBOPROST TROMETHAMINE 250 MICROGRAM(S): 250 INJECTION, SOLUTION INTRAMUSCULAR at 23:33

## 2020-11-15 RX ADMIN — Medication 0.2 MILLIGRAM(S): at 22:37

## 2020-11-15 NOTE — CHART NOTE - NSCHARTNOTEFT_GEN_A_CORE
Spoke to  Dr. Duenas regarding this patient. Discussed that when I was initially presented with this case, the patient was stable and did not have signs of chorioamnionitis. Discussed that now that patient has chorioamnionitis, would recommend D&E for expedited evacuation of uterus. Dr. Duenas discussed that patient is currently clinically stable, with stable vital signs and would prefer to proceed with IOL given prior counseling. Cervical cerclage has been removed Was initially contacted by [facility] regarding this patient around 5p. Pt came in with painless cervical dilation and was requesting D&E. Pt did not have any signs of infection or hemorrhage at that time. Discussed with resident that I would be available on Monday to do the procedure or earlier if clinical status changed.     Around 7pm, was asked to order mifepristone for patient. Upon reviewing the chart, discovered that there was a change in the clinical status. Spoke to  Dr. Duenas regarding this patient. Discussed that when I was initially presented with this case, the patient was stable and did not have signs of chorioamnionitis. Discussed that now that patient has chorioamnionitis, would recommend D&E. I called the OR to book the case but due to an urgent IR massive transfusion protocol case and a laparoscopic cholecystectomy, an estimated time for my case could not be provided. Dr. Duenas discussed that patient is currently clinically stable, with stable vital signs and would prefer to proceed with IOL given prior counseling. Cervical cerclage has been removed, with rupture during removal and pt was 2/50/-2 with the head engaged. Invanz has been started and misoprostol was administered to begin IOL. Discussed that mifepristone can still be administered simultaneously and would still be effective. Ordered mifepristone 200mg with pharmacy. Discussed that I am available to come in for urgent D&E if the patients clinical status changes.     Rhonda Minaya MD

## 2020-11-15 NOTE — CHART NOTE - NSCHARTNOTEFT_GEN_A_CORE
Attending Note     Called to the room  Fetus delivered with apgars 0/0/0  BSUS confirm placenta in JOSELUIS   Bladder emptied with straight cath   Cytotec 1000 mcg, hemabate IM, methergine IM   Bleeding stable       R Yulissa FIGUEROA

## 2020-11-15 NOTE — CHART NOTE - NSCHARTNOTEFT_GEN_A_CORE
R3 Note    Patient seen and examined at bedside for placement of Vaginal Misoprostol    NAD  Vital Signs Last 24 Hrs  T(C): 37.2 (15 Nov 2020 16:48), Max: 37.5 (15 Nov 2020 14:30)  T(F): 98.96 (15 Nov 2020 16:48), Max: 99.5 (15 Nov 2020 14:30)  HR: 86 (15 Nov 2020 18:38) (73 - 96)  BP: 132/70 (15 Nov 2020 18:38) (120/57 - 146/72)  BP(mean): --  RR: 18 (15 Nov 2020 16:08) (18 - 18)  SpO2: 100% (15 Nov 2020 14:05) (100% - 100%)    SVE: 2/50/-3    Plan  -Vaginal Cytotec q3 hours    d/w Dr. Sammy Wilson PGY2

## 2020-11-15 NOTE — CHART NOTE - NSCHARTNOTEFT_GEN_A_CORE
R3 Chart Note    Went to counseled patient at bedside with Dr. Langford. Patient counseled on recommendation for cerclage removal at this time. Patient made aware that there may be rupture of membranes at time of cerclage removal. Patient counseled on subsequent options for either expectant management overnight with D&E in the morning with Dr. Minaya vs. induction of labor. Patient made aware that if she were to develop signs and symptoms concerning for infection, we would not longer allow expectant management. Patient reports slightly more cramping, asked about if pain medication would be permitted overnight while awaiting D&E. Patient made aware that pain medication can be provided. Patient requesting time to review options with partner and mother.    EVETTE Ballard PGY3  Patient seen and counseled with Dr. Langford R3 Chart Note    Went to counseled patient at bedside with Dr. Langford. Patient counseled on recommendation for cerclage removal at this time. Patient made aware that there may be rupture of membranes at time of cerclage removal. Patient counseled on subsequent options for either expectant management overnight with D&E in the morning with Dr. Minaya vs. induction of labor. Patient made aware that if she were to develop signs and symptoms concerning for infection, we would no longer allow expectant management. Patient reports slightly more cramping, asked about if pain medication would be permitted overnight while awaiting D&E. Patient made aware that pain medication can be provided. Patient requesting time to review options with partner and mother.    EVETTE Ballard PGY3  Patient seen and counseled with Dr. Langford    Agree with above resident note.  Pt agrees to cerclage removal, pt wants to reevaluate plan once cerclage is removed and cervix is assessed.  Discussed would not recommend waiting until the morning in the event pt develops signs of infection.  Dr. Minaya states she would like to be called if pt becomes septic overnight.  Pt being transferred to L+D for cerclage removal at bedside.  If pt unable to tolerate may need to be removed in OR.  Pt reports mild cramping now.     Che Langford MD R3 Chart Note    Went to counseled patient at bedside with Dr. Langford. Patient counseled on recommendation for cerclage removal at this time. Patient made aware that there may be rupture of membranes at time of cerclage removal. Patient counseled on subsequent options for either expectant management overnight with D&E in the morning with Dr. Minaya vs. induction of labor. Patient made aware that if she were to develop signs and symptoms concerning for infection, we would no longer allow expectant management. Patient reports slightly more cramping, asked about if pain medication would be permitted overnight while awaiting D&E. Patient made aware that pain medication can be provided. Patient requesting time to review options with partner and mother.    EVETTE Ballard PGY3  Patient seen and counseled with Dr. Langford    Agree with above resident note.  Pt agrees to cerclage removal, pt wants to reevaluate plan once cerclage is removed and cervix is assessed.  Discussed would not recommend waiting until the morning in the event pt develops signs of infection.  Dr. Minaya aware of pt and would like to be notified of any change in pt's status.  Pt being transferred to L+D for cerclage removal at bedside.  If pt unable to tolerate may need to be removed in OR.  Pt reports mild cramping now.     Che Langford MD

## 2020-11-15 NOTE — OB PROVIDER TRIAGE NOTE - NSOBPROVIDERNOTE_OBGYN_ALL_OB_FT
29 y/o  @ 17.5 wks gestation presents with c/o increased pelvic pressure while standing since last evening denies any LOf or VB denies any n/v/d denies any fever or chills denies any hematuria or dysuria pt with a hx incompetent cervix 2020 and had prophylactic cerclage placed 10/13/2020 and has been on vaginal progesterone and vaginal rest precautions otherwise reports no other ap care comp at this time      abdomen: soft, nt on palp  SSE: membranes bulging through cervical os   no LOF no VB noted  Dr Langford and Dr Ballard at bedside   SVE done by Dr Ballard - ~ 4-5 cm with bulging membranes   T(C): 37.5 (11-15-20 @ 14:31), Max: 37.5 (11-15-20 @ 14:30)  HR: 96 (11-15-20 @ 14:46) (86 - 96)  BP: 146/72 (11-15-20 @ 14:46) (142/87 - 146/72)  RR: 18 (11-15-20 @ 14:30) (18 - 18)  SpO2: 100% (11-15-20 @ 14:05) (100% - 100%)  Dr Ballard and Dr Langford d/w pt need for induction for termination of pregnancy / D&E   pt desires to talk over options with her partner  admit to l&d  incompetent cervix @ 17.5 wks gest for induction of termination of pregnancy / D&E   see admission orders         NKA  med hx: denies  surg hx:   D&C x's 2   cerclage placement  gyn hx:   chlamydia in 2017 and treated   ob hx:   ETOP x's 2  2020 SAB @ 19 wks / incompetent cervix   meds:   PNV   vaginal progesterone suppositories      5'3  157

## 2020-11-15 NOTE — OB PROVIDER H&P - ASSESSMENT
29 y/o  @ 17.5 wks gestation presents with c/o increased pelvic pressure while standing since last evening denies any LOf or VB denies any n/v/d denies any fever or chills denies any hematuria or dysuria pt with a hx incompetent cervix 2020 and had prophylactic cerclage placed 10/13/2020 and has been on vaginal progesterone and vaginal rest precautions otherwise reports no other ap care comp at this time      abdomen: soft, nt on palp  SSE: membranes bulging through cervical os   no LOF no VB noted  Dr Langford and Dr Ballard at bedside   SVE done by Dr Ballard - ~ 4-5 cm with bulging membranes   T(C): 37.5 (11-15-20 @ 14:31), Max: 37.5 (11-15-20 @ 14:30)  HR: 96 (11-15-20 @ 14:46) (86 - 96)  BP: 146/72 (11-15-20 @ 14:46) (142/87 - 146/72)  RR: 18 (11-15-20 @ 14:30) (18 - 18)  SpO2: 100% (11-15-20 @ 14:05) (100% - 100%)  Dr Ballard and Dr Langford d/w pt need for induction for termination of pregnancy / D&E   pt desires to talk over options with her partner  admit to l&d  incompetent cervix @ 17.5 wks gest for induction of termination of pregnancy / D&E   see admission orders         NKA  med hx: denies  surg hx:   D&C x's 2   cerclage placement  gyn hx:   chlamydia in 2017 and treated   ob hx:   ETOP x's 2  2020 SAB @ 19 wks / incompetent cervix   meds:   PNV   vaginal progesterone suppositories      5'3  157   31 y/o  @ 17.5 wks gestation presents with c/o increased pelvic pressure while standing since last evening denies any LOf or VB denies any n/v/d denies any fever or chills denies any hematuria or dysuria pt with a hx incompetent cervix 2020 and had prophylactic cerclage placed 10/13/2020 and has been on vaginal progesterone and vaginal rest precautions otherwise reports no other ap care comp at this time      abdomen: soft, nt on palp  SSE: membranes bulging through cervical os   no LOF no VB noted  Dr Langford and Dr Ballard at bedside   SVE done by Dr Ballard - ~ 4-5 cm diameter of bulging membranes hourglassing through narrower cervix, stitch palpable anteriorly  T(C): 37.5 (11-15-20 @ 14:31), Max: 37.5 (11-15-20 @ 14:30)  HR: 96 (11-15-20 @ 14:46) (86 - 96)  BP: 146/72 (11-15-20 @ 14:46) (142/87 - 146/72)  RR: 18 (11-15-20 @ 14:30) (18 - 18)  SpO2: 100% (11-15-20 @ 14:05) (100% - 100%)  Dr Ballard and Dr Langford d/w pt need for induction for termination of pregnancy / D&E   pt desires to talk over options with her partner  admit to l&d  incompetent cervix @ 17.5 wks gest for induction of termination of pregnancy / D&E   see admission orders         NKA  med hx: denies  surg hx:   D&C x's 2   cerclage placement  gyn hx:   chlamydia in 2017 and treated   ob hx:   ETOP x's 2  2020 SAB @ 19 wks / incompetent cervix   meds:   PNV   vaginal progesterone suppositories      5'3  157

## 2020-11-15 NOTE — ED ADULT NURSE NOTE - CHIEF COMPLAINT QUOTE
17 wks pregnant pt had cerclage placed on 10/13 is now experiencing lower pelvic pain. no bleeding or cramping 4/20

## 2020-11-15 NOTE — CHART NOTE - NSCHARTNOTEFT_GEN_A_CORE
Attending Note     Assumed care of pt at 1800     I went with Dr. Langford to remove cerclage.   Two stitches removed with no complications   Membranes ruptured in middle of procedure   SVE 2/50/-3  BSUS vertex  on exam + fundal tenderness  Recommend IOL given concern for infection  will given invanz given + fundal tenderness and elevated WBC   will plan for cytotec as per protocol   discussed pain management and wants IV pain meds       LORENZO Duenas MD

## 2020-11-15 NOTE — OB PROVIDER H&P - ATTENDING COMMENTS
Pt seen, I was present for second speculum exam with PGY- 3 Dr. Ballard.  Membranes noted to be bulging ~5 cm visualized with SSE.  On exam stitch noted to be in place and palpated anteriorly.  Pt denies fevers/chills or abdominal pain.  Pt only reported pressure.      Discussed recommendation to remove cerclage as membranes are bulging through cervix.  Also discussed medical management vs surgical management with D+E.  Pt asked for time for her partner to come to the hospital and they would then make decision together.  During that time Dr. Ballard reached out to several providers who have D+E privileges to see if anyone would be available.  Pt was ordered for labs but did not want blood drawn until partner arrived.      Admit to L+D, Covid swab and NPO at this time    Che Langford MD

## 2020-11-15 NOTE — OB PROVIDER DELIVERY SUMMARY - NSPROVIDERDELIVERYNOTE_OBGYN_ALL_OB_FT
VD of 17wk5d fetus. Cord clamped and cut. Apgars 0/0. Straight cath of urine 100 cc evacuated. Sonogram performed placenta noted to be beginning to separate. Metherginex1, Hemabatex1 given. Cyotec CT given. To give second dose of hemabate at 11pm and will repeat VE. VD of 17wk5d fetus. Cord clamped and cut. Apgars 0/0. Straight cath of urine 100 cc evacuated. Sonogram performed placenta noted to be beginning to separate. Metherginex1, Hemabatex1 given. Cyotec MT given. To give second dose of hemabate at 11pm and will repeat VE.    Attending note   Pt delivered non viable 17 week fetus with foul smelling amniotic fluid   Cord clamp and cut.   hemabate series, methergine and cytotec given to facilitate delivery of placenta  bladder emptied   2 hours later placenta delivered intact  cultures sent   SW in AM   AM labs with lactate   continue raj Duenas MD

## 2020-11-15 NOTE — ED ADULT TRIAGE NOTE - CHIEF COMPLAINT QUOTE
17 wks pregnant pt had cerclage placed on 10/13 is now experiencing lower pelvic pain. no bleeding or cramping 4/20 17 wks pregnant pt had cerclage placed on 10/13 is now experiencing lower pelvic pain. no bleeding or cramping. due date 4/20  Spoke with OB triage pt to L&D

## 2020-11-15 NOTE — CHART NOTE - NSCHARTNOTEFT_GEN_A_CORE
R3 Note    Patient examined before 3rd dose of hemabate. Placenta still in situ. Placenta emerging passed cervix. Sonogram performed, with placenta . Third dose of hemabate given. Will reevaluate      DAVID Mullins PGY3  Dr. Sammy Ortiz at bedside

## 2020-11-15 NOTE — OB PROVIDER IHI INDUCTION/AUGMENTATION NOTE - NS_CHECKALL_OBGYN_ALL_OB
H&P was completed/Order was written/FHR was reviewed/Contractions pattern was reviewed/Induction / Augmentation was discussed

## 2020-11-15 NOTE — OB RN DELIVERY SUMMARY - NS_SEPSISRSKCALC_OBGYN_ALL_OB_FT
Unable to calculate the EOS score due to gestational age being less than 34 weeks or more than 43 weeks.

## 2020-11-15 NOTE — CHART NOTE - NSCHARTNOTEFT_GEN_A_CORE
R3 Chart Note    31 y/o  at 17w5d s/p cerclage placement 4 weeks ago presenting with pelvic pressure. Called to triage with Dr. Langford, service attending, to assist with pelvic exam as membranes were visualized on initial attempt.    SSE: bulging membranes, tense, 1cm above hymenal ring  SVE: tense bulging bag 5cm diameter in the vagina, cerclage stitch palpable anteriorly, unable to perform complete cervical exam 2/2 bulging membranes    Patient counseled on recommendation for induction vs. D&E if it can be made available. Patient wishes to speak with partner and will let us know.    EVETTE Ballard PGY3  w/ Dr. Langford R3 Chart Note    31 y/o  at 17w5d s/p history indicated cerclage placement 4 weeks ago, on vaginal progesterone, (hx of 19w loss) presenting with pelvic pressure. Called to triage with Dr. Langford, service attending, to assist with pelvic exam as membranes were visualized on initial attempt.    SSE: bulging membranes, tense, 1cm above hymenal ring  SVE: tense bulging bag 5cm diameter in the vagina, cerclage stitch palpable anteriorly, unable to perform complete cervical exam 2/2 bulging membranes    Patient counseled on recommendation for induction vs. D&E if it can be made available. Patient wishes to speak with partner and will let us know.    EVETTE Ballard PGY3  w/ Dr. Langford R3 Chart Note    29 y/o  at 17w5d s/p history indicated cerclage placement 4 weeks ago, on vaginal progesterone, (hx of 19w loss) presenting with pelvic pressure. Called to triage with Dr. Langford, service attending, to assist with pelvic exam as membranes were visualized on initial attempt.    SSE: bulging membranes, tense, 1cm above hymenal ring  SVE: tense bulging bag 5cm diameter in the vagina, cerclage stitch palpable anteriorly, unable to perform complete cervical exam 2/2 hour-glassing of bulging membranes    Patient counseled on recommendation for induction vs. D&E if it can be made available. Patient wishes to speak with partner and will let us know.    EVETTE Ballard PGY3  w/ Dr. Langford

## 2020-11-16 VITALS
TEMPERATURE: 99 F | SYSTOLIC BLOOD PRESSURE: 112 MMHG | HEART RATE: 89 BPM | RESPIRATION RATE: 17 BRPM | DIASTOLIC BLOOD PRESSURE: 58 MMHG | OXYGEN SATURATION: 97 %

## 2020-11-16 LAB
ALBUMIN SERPL ELPH-MCNC: 3.7 G/DL — SIGNIFICANT CHANGE UP (ref 3.3–5)
ALP SERPL-CCNC: 53 U/L — SIGNIFICANT CHANGE UP (ref 40–120)
ALT FLD-CCNC: 11 U/L — SIGNIFICANT CHANGE UP (ref 4–33)
ANION GAP SERPL CALC-SCNC: 11 MMO/L — SIGNIFICANT CHANGE UP (ref 7–14)
APTT BLD: 26.9 SEC — LOW (ref 27–36.3)
AST SERPL-CCNC: 14 U/L — SIGNIFICANT CHANGE UP (ref 4–32)
BASOPHILS # BLD AUTO: 0.05 K/UL — SIGNIFICANT CHANGE UP (ref 0–0.2)
BASOPHILS NFR BLD AUTO: 0.3 % — SIGNIFICANT CHANGE UP (ref 0–2)
BILIRUB SERPL-MCNC: 0.6 MG/DL — SIGNIFICANT CHANGE UP (ref 0.2–1.2)
BUN SERPL-MCNC: 7 MG/DL — SIGNIFICANT CHANGE UP (ref 7–23)
C TRACH RRNA SPEC QL NAA+PROBE: SIGNIFICANT CHANGE UP
CALCIUM SERPL-MCNC: 9.8 MG/DL — SIGNIFICANT CHANGE UP (ref 8.4–10.5)
CHLORIDE SERPL-SCNC: 102 MMOL/L — SIGNIFICANT CHANGE UP (ref 98–107)
CO2 SERPL-SCNC: 23 MMOL/L — SIGNIFICANT CHANGE UP (ref 22–31)
CREAT SERPL-MCNC: 0.75 MG/DL — SIGNIFICANT CHANGE UP (ref 0.5–1.3)
EOSINOPHIL # BLD AUTO: 0.09 K/UL — SIGNIFICANT CHANGE UP (ref 0–0.5)
EOSINOPHIL NFR BLD AUTO: 0.5 % — SIGNIFICANT CHANGE UP (ref 0–6)
FIBRINOGEN PPP-MCNC: 633 MG/DL — HIGH (ref 290–520)
GLUCOSE SERPL-MCNC: 80 MG/DL — SIGNIFICANT CHANGE UP (ref 70–99)
HCT VFR BLD CALC: 39.5 % — SIGNIFICANT CHANGE UP (ref 34.5–45)
HGB BLD-MCNC: 13.1 G/DL — SIGNIFICANT CHANGE UP (ref 11.5–15.5)
IMM GRANULOCYTES NFR BLD AUTO: 0.5 % — SIGNIFICANT CHANGE UP (ref 0–1.5)
INR BLD: 1.05 — SIGNIFICANT CHANGE UP (ref 0.88–1.16)
LACTATE SERPL-SCNC: 1.5 MMOL/L — SIGNIFICANT CHANGE UP (ref 0.5–2)
LYMPHOCYTES # BLD AUTO: 12.6 % — LOW (ref 13–44)
LYMPHOCYTES # BLD AUTO: 2.21 K/UL — SIGNIFICANT CHANGE UP (ref 1–3.3)
MCHC RBC-ENTMCNC: 26.8 PG — LOW (ref 27–34)
MCHC RBC-ENTMCNC: 33.2 % — SIGNIFICANT CHANGE UP (ref 32–36)
MCV RBC AUTO: 80.9 FL — SIGNIFICANT CHANGE UP (ref 80–100)
MONOCYTES # BLD AUTO: 1.14 K/UL — HIGH (ref 0–0.9)
MONOCYTES NFR BLD AUTO: 6.5 % — SIGNIFICANT CHANGE UP (ref 2–14)
N GONORRHOEA RRNA SPEC QL NAA+PROBE: SIGNIFICANT CHANGE UP
NEUTROPHILS # BLD AUTO: 13.9 K/UL — HIGH (ref 1.8–7.4)
NEUTROPHILS NFR BLD AUTO: 79.6 % — HIGH (ref 43–77)
NRBC # FLD: 0 K/UL — SIGNIFICANT CHANGE UP (ref 0–0)
PLATELET # BLD AUTO: 285 K/UL — SIGNIFICANT CHANGE UP (ref 150–400)
PMV BLD: 9.4 FL — SIGNIFICANT CHANGE UP (ref 7–13)
POTASSIUM SERPL-MCNC: 4.1 MMOL/L — SIGNIFICANT CHANGE UP (ref 3.5–5.3)
POTASSIUM SERPL-SCNC: 4.1 MMOL/L — SIGNIFICANT CHANGE UP (ref 3.5–5.3)
PROT SERPL-MCNC: 6.3 G/DL — SIGNIFICANT CHANGE UP (ref 6–8.3)
PROTHROM AB SERPL-ACNC: 12.1 SEC — SIGNIFICANT CHANGE UP (ref 10.6–13.6)
RBC # BLD: 4.88 M/UL — SIGNIFICANT CHANGE UP (ref 3.8–5.2)
RBC # FLD: 13.8 % — SIGNIFICANT CHANGE UP (ref 10.3–14.5)
SARS-COV-2 IGG SERPL QL IA: NEGATIVE — SIGNIFICANT CHANGE UP
SARS-COV-2 IGM SERPL IA-ACNC: 0.08 INDEX — SIGNIFICANT CHANGE UP
SODIUM SERPL-SCNC: 136 MMOL/L — SIGNIFICANT CHANGE UP (ref 135–145)
WBC # BLD: 17.48 K/UL — HIGH (ref 3.8–10.5)
WBC # FLD AUTO: 17.48 K/UL — HIGH (ref 3.8–10.5)

## 2020-11-16 PROCEDURE — 88305 TISSUE EXAM BY PATHOLOGIST: CPT | Mod: 26

## 2020-11-16 RX ORDER — OXYCODONE HYDROCHLORIDE 5 MG/1
5 TABLET ORAL
Refills: 0 | Status: DISCONTINUED | OUTPATIENT
Start: 2020-11-16 | End: 2020-11-16

## 2020-11-16 RX ORDER — IBUPROFEN 200 MG
1 TABLET ORAL
Qty: 0 | Refills: 0 | DISCHARGE
Start: 2020-11-16

## 2020-11-16 RX ORDER — PROGESTERONE 200 MG/1
0 CAPSULE, LIQUID FILLED ORAL
Qty: 0 | Refills: 0 | DISCHARGE

## 2020-11-16 RX ORDER — DIPHENHYDRAMINE HCL 50 MG
25 CAPSULE ORAL EVERY 6 HOURS
Refills: 0 | Status: DISCONTINUED | OUTPATIENT
Start: 2020-11-16 | End: 2020-11-16

## 2020-11-16 RX ORDER — MAGNESIUM HYDROXIDE 400 MG/1
30 TABLET, CHEWABLE ORAL
Refills: 0 | Status: DISCONTINUED | OUTPATIENT
Start: 2020-11-16 | End: 2020-11-16

## 2020-11-16 RX ORDER — SODIUM CHLORIDE 9 MG/ML
3 INJECTION INTRAMUSCULAR; INTRAVENOUS; SUBCUTANEOUS EVERY 8 HOURS
Refills: 0 | Status: DISCONTINUED | OUTPATIENT
Start: 2020-11-16 | End: 2020-11-16

## 2020-11-16 RX ORDER — LANOLIN
1 OINTMENT (GRAM) TOPICAL EVERY 6 HOURS
Refills: 0 | Status: DISCONTINUED | OUTPATIENT
Start: 2020-11-16 | End: 2020-11-16

## 2020-11-16 RX ORDER — OXYCODONE HYDROCHLORIDE 5 MG/1
5 TABLET ORAL ONCE
Refills: 0 | Status: DISCONTINUED | OUTPATIENT
Start: 2020-11-16 | End: 2020-11-16

## 2020-11-16 RX ORDER — SIMETHICONE 80 MG/1
80 TABLET, CHEWABLE ORAL EVERY 4 HOURS
Refills: 0 | Status: DISCONTINUED | OUTPATIENT
Start: 2020-11-16 | End: 2020-11-16

## 2020-11-16 RX ORDER — ACETAMINOPHEN 500 MG
3 TABLET ORAL
Qty: 0 | Refills: 0 | DISCHARGE
Start: 2020-11-16

## 2020-11-16 RX ORDER — ACETAMINOPHEN 500 MG
975 TABLET ORAL
Refills: 0 | Status: DISCONTINUED | OUTPATIENT
Start: 2020-11-16 | End: 2020-11-16

## 2020-11-16 RX ORDER — IBUPROFEN 200 MG
600 TABLET ORAL EVERY 6 HOURS
Refills: 0 | Status: DISCONTINUED | OUTPATIENT
Start: 2020-11-16 | End: 2020-11-16

## 2020-11-16 NOTE — PROGRESS NOTE ADULT - SUBJECTIVE AND OBJECTIVE BOX
OB Progress Note: VD PPD#1    S: 31yo PPD#1 s/p Vaginal Delivery at 17 weeks due to  labor. Patient feels well. Pain is well controlled. Has not yet eaten but is passing flatus. She is voiding spontaneously, and ambulating without difficulty. Endorses light vaginal bleeding, soaking less than 1 pad/hour. Denies CP/SOB. Denies lightheadedness/dizziness. Denies N/V. *Breast or bottle feeding*    O:  Vitals:  Vital Signs Last 24 Hrs  T(C): 37.1 (2020 05:37), Max: 37.5 (15 Nov 2020 14:30)  T(F): 98.7 (2020 05:37), Max: 99.5 (15 Nov 2020 14:30)  HR: 76 (2020 05:37) (71 - 116)  BP: 122/64 (2020 05:37) (109/53 - 153/74)  BP(mean): --  RR: 16 (2020 05:37) (15 - 18)  SpO2: 100% (2020 05:37) (93% - 100%)    MEDICATIONS  (STANDING):  acetaminophen   Tablet .. 975 milliGRAM(s) Oral <User Schedule>  ertapenem  IVPB      ertapenem  IVPB 1000 milliGRAM(s) IV Intermittent every 24 hours  ibuprofen  Tablet. 600 milliGRAM(s) Oral every 6 hours  influenza   Vaccine 0.5 milliLiter(s) IntraMuscular once  lactated ringers. 1000 milliLiter(s) (125 mL/Hr) IV Continuous <Continuous>  mifepristone 200 milliGRAM(s) Oral once  morphine  - Injectable 2 milliGRAM(s) IV Push once  sodium chloride 0.9% lock flush 3 milliLiter(s) IV Push every 8 hours      Labs:  Blood type: O Positive  Rubella IgG: RPR: Negative                          13.1   17.48<H> >-----------< 285    (  @ 06:14 )             39.5                        14.6   15.69<H> >-----------< 329    ( 11-15 @ 17:00 )             44.2    20 @ 06:14      136  |  102  |  7   ----------------------------<  80  4.1   |  23  |  0.75        Ca    9.8      2020 06:14    TPro  6.3  /  Alb  3.7  /  TBili  0.6  /  DBili  x   /  AST  14  /  ALT  11  /  AlkPhos  53  20 @ 06:14          Physical Exam:  General: Patient in bed teary   Heart:RRR, all extremities well perfused  Lungs: breathing comfortably  Abdomen: soft, non-tender, non-distended, fundus firm  Vaginal: lochia wnl  Extremities: No calf tenderness or erythema                 OB Progress Note: VD PPD#1    S: 31yo PPD#1 s/p Vaginal Delivery at 17 weeks due to  labor. Patient feels well. Pain is well controlled. Has not yet eaten but is passing flatus. She is voiding spontaneously, and ambulating without difficulty. Endorses light vaginal bleeding, soaking less than 1 pad/hour. Denies CP/SOB. Denies lightheadedness/dizziness. Denies N/V.    O:  Vitals:  Vital Signs Last 24 Hrs  T(C): 37.1 (2020 05:37), Max: 37.5 (15 Nov 2020 14:30)  T(F): 98.7 (2020 05:37), Max: 99.5 (15 Nov 2020 14:30)  HR: 76 (2020 05:37) (71 - 116)  BP: 122/64 (2020 05:37) (109/53 - 153/74)  BP(mean): --  RR: 16 (2020 05:37) (15 - 18)  SpO2: 100% (2020 05:37) (93% - 100%)    MEDICATIONS  (STANDING):  acetaminophen   Tablet .. 975 milliGRAM(s) Oral <User Schedule>  ertapenem  IVPB      ertapenem  IVPB 1000 milliGRAM(s) IV Intermittent every 24 hours  ibuprofen  Tablet. 600 milliGRAM(s) Oral every 6 hours  influenza   Vaccine 0.5 milliLiter(s) IntraMuscular once  lactated ringers. 1000 milliLiter(s) (125 mL/Hr) IV Continuous <Continuous>  mifepristone 200 milliGRAM(s) Oral once  morphine  - Injectable 2 milliGRAM(s) IV Push once  sodium chloride 0.9% lock flush 3 milliLiter(s) IV Push every 8 hours      Labs:  Blood type: O Positive  Rubella IgG: RPR: Negative                          13.1   17.48<H> >-----------< 285    (  @ 06:14 )             39.5                        14.6   15.69<H> >-----------< 329    ( 11-15 @ 17:00 )             44.2    20 @ 06:14      136  |  102  |  7   ----------------------------<  80  4.1   |  23  |  0.75        Ca    9.8      2020 06:14    TPro  6.3  /  Alb  3.7  /  TBili  0.6  /  DBili  x   /  AST  14  /  ALT  11  /  AlkPhos  53  20 @ 06:14          Physical Exam:  General: Patient in bed teary   Heart:RRR, all extremities well perfused  Lungs: breathing comfortably  Abdomen: soft, non-tender, non-distended, fundus firm  Vaginal: lochia wnl  Extremities: No calf tenderness or erythema                 OB Progress Note: VD PPD#1    S: 31yo PPD#1 s/p Vaginal Delivery at 17 weeks 5 days  due to  labor. Patient feels well. Pain is well controlled. Has not yet eaten but is passing flatus. She is voiding spontaneously, and ambulating without difficulty. Endorses light vaginal bleeding, soaking less than 1 pad/hour. Denies CP/SOB. Denies lightheadedness/dizziness. Denies N/V.    O:  Vitals:  Vital Signs Last 24 Hrs  T(C): 37.1 (2020 05:37), Max: 37.5 (15 Nov 2020 14:30)  T(F): 98.7 (2020 05:37), Max: 99.5 (15 Nov 2020 14:30)  HR: 76 (2020 05:37) (71 - 116)  BP: 122/64 (2020 05:37) (109/53 - 153/74)  BP(mean): --  RR: 16 (2020 05:37) (15 - 18)  SpO2: 100% (2020 05:37) (93% - 100%)    MEDICATIONS  (STANDING):  acetaminophen   Tablet .. 975 milliGRAM(s) Oral <User Schedule>  ertapenem  IVPB      ertapenem  IVPB 1000 milliGRAM(s) IV Intermittent every 24 hours  ibuprofen  Tablet. 600 milliGRAM(s) Oral every 6 hours  influenza   Vaccine 0.5 milliLiter(s) IntraMuscular once  lactated ringers. 1000 milliLiter(s) (125 mL/Hr) IV Continuous <Continuous>  mifepristone 200 milliGRAM(s) Oral once  morphine  - Injectable 2 milliGRAM(s) IV Push once  sodium chloride 0.9% lock flush 3 milliLiter(s) IV Push every 8 hours      Labs:  Blood type: O Positive  Rubella IgG: RPR: Negative                          13.1   17.48<H> >-----------< 285    (  @ 06:14 )             39.5                        14.6   15.69<H> >-----------< 329    ( 11-15 @ 17:00 )             44.2    20 @ 06:14      136  |  102  |  7   ----------------------------<  80  4.1   |  23  |  0.75        Ca    9.8      2020 06:14    TPro  6.3  /  Alb  3.7  /  TBili  0.6  /  DBili  x   /  AST  14  /  ALT  11  /  AlkPhos  53  20 @ 06:14          Physical Exam:  General: Patient in bed teary   Heart:RRR, all extremities well perfused  Lungs: breathing comfortably  Abdomen: soft, non-tender, non-distended, fundus firm  Vaginal: lochia wnl  Extremities: No calf tenderness or erythema

## 2020-11-16 NOTE — DISCHARGE NOTE OB - CARE PROVIDER_API CALL
Sandra Rosado  OBSTETRICS AND GYNECOLOGY  [address], [address]  [place] [address]  Phone: [phone]  Fax: [phone]  Follow Up Time:     Rivera Barrientos  OBSTETRICS AND GYNECOLOGY  [address], [address]  [place] [address]  Phone: [phone]  Fax: [phone]  Follow Up Time:     Kiara Hopson)  Obstetrics and Gynecology  [address], [address]  [place] [address]  Phone: [phone]  Fax: [phone]  Follow Up Time:

## 2020-11-16 NOTE — DISCHARGE NOTE OB - PLAN OF CARE
Recover Make your follow-up appointment with your doctor after discharge . No heavy lifting, driving, or strenuous activity for 6 weeks. Nothing per vagina such as tampons, intercourse, douches, or tub baths for 6 weeks or until you see your doctor. Call your doctor with any signs and symptoms of infection such as fever, chills, nausea, or vomiting. Call your doctor if you're unable to tolerate food, increase in vaginal bleeding, or have difficulty urinating. Call your doctor if you have pain that is not relieved by your prescribed medications. Notify your doctor with any other concerns.   Prior to getting pregnant please see Dr. Barrientos for abdominal cerclage consultation. Please see maternal fetal medicine for consultation.

## 2020-11-16 NOTE — DISCHARGE NOTE OB - MEDICATION SUMMARY - MEDICATIONS TO STOP TAKING
I will STOP taking the medications listed below when I get home from the hospital:    progesterone 200 mg vaginal suppository  -- vaginal once (at bedtime)

## 2020-11-16 NOTE — DISCHARGE NOTE OB - PROVIDER TOKENS
PROVIDER:[TOKEN:[[id number]]],PROVIDER:[TOKEN:[[id number]]],PROVIDER:[TOKEN:[[id number]]]

## 2020-11-16 NOTE — PROGRESS NOTE ADULT - ASSESSMENT
29y/o   PPD#1 from Vaginal Delivery @ 17 weeks due to PTL.s/p Cerclage removal. Due to high likelihood of Intraamniotic infection leading to  labor, patient to be monitored closely .       31y/o   PPD#1 from Vaginal Delivery @ 17 weeks due to PTL.s/p Cerclage removal. Due to high likelihood of Chorioamnionitis leading to  labor, patient to be monitored closely .       29y/o   PPD#1 from Vaginal Delivery @ 17 weeks 5 days due to PTL.s/p Cerclage removal. Due to high likelihood of Chorioamnionitis leading to  labor, patient to be monitored closely .

## 2020-11-16 NOTE — DISCHARGE NOTE OB - ADDITIONAL INSTRUCTIONS
Make your follow-up appointment with your doctor after discharge . No heavy lifting, driving, or strenuous activity for 6 weeks. Nothing per vagina such as tampons, intercourse, douches, or tub baths for 6 weeks or until you see your doctor. Call your doctor with any signs and symptoms of infection such as fever, chills, nausea, or vomiting. Call your doctor if you're unable to tolerate food, increase in vaginal bleeding, or have difficulty urinating. Call your doctor if you have pain that is not relieved by your prescribed medications. Notify your doctor with any other concerns.   Prior to getting pregnant please see Dr. Barrientos for abdominal cerclage consultation. Please see maternal fetal medicine for consultation.

## 2020-11-16 NOTE — DISCHARGE NOTE OB - PATIENT PORTAL LINK FT
You can access the FollowMyHealth Patient Portal offered by [facility] by registering at the following website: [email or web address]. By joining [facility]’s FollowMyHealth portal, you will also be able to view your health information using other applications (apps) compatible with our system.

## 2020-11-16 NOTE — PROGRESS NOTE ADULT - PROBLEM SELECTOR PLAN 1
-s/p cerclage removal    -Placenta removed  -WBC:15.69->17.48    Hct:44.2->39.5  PT:13->12.1  PTT:37.1->26.9  Fibrinogen 6330    Lactate:1.5    -Continue on Invanz   -F/u blood, urine and placenta cultures  -Continue with PO analgesia  -OOB, increase ambulation   -Encouraged eating   -To be seen by social work   -f/u w/ Dr. Barrientos outpatient for possibility of abdominal cerclage for next pregnancy      Grisel Saucedo, PGY-1 -s/p cerclage removal    -Placenta removed  -WBC:15.69->17.48    Hct:44.2->39.5  PT:13->12.1  PTT:37.1->26.9  Fibrinogen 6330    Lactate:1.5    -Continue on Invanz   -F/u blood, urine and placenta cultures  -Continue with PO analgesia  -OOB, increase ambulation   -Encouraged eating   -To be seen by social work   -f/u w/ Dr. Barrientos outpatient for possibility of abdominal cerclage for next pregnancy   -To use cold packs on breast to help with possible engorgement      Grisel Saucedo, PGY-1

## 2020-11-16 NOTE — PROGRESS NOTE ADULT - ATTENDING COMMENTS
Associate Chief of L & D (Late entry)     I have not met this patient before today.  She was admitted by Dr Langford and delivered by Dr Sammy Ortiz.  Upon review of the chart she presented with increased pressure and when the cerclage was removed she was dilated and went on have a vagfinal delivery    OB Progress Note:  PPD#1    S: 31yo  PPD#1 s/p . Patient was a little tearful this morning    O:  Vitals:  Vital Signs Last 24 Hrs  T(C): 37.2 (2020 09:23), Max: 37.5 (15 Nov 2020 14:30)  T(F): 99 (2020 09:23), Max: 99.5 (15 Nov 2020 14:30)  HR: 89 (2020 09:23) (71 - 116)  BP: 112/58 (2020 09:23) (109/53 - 153/74)  RR: 17 (2020 09:23) (15 - 18)  SpO2: 97% (2020 09:23) (93% - 100%)    MEDICATIONS  (STANDING):  acetaminophen   Tablet .. 975 milliGRAM(s) Oral <User Schedule>  ertapenem  IVPB      ertapenem  IVPB 1000 milliGRAM(s) IV Intermittent every 24 hours  ibuprofen  Tablet. 600 milliGRAM(s) Oral every 6 hours  influenza   Vaccine 0.5 milliLiter(s) IntraMuscular once  lactated ringers. 1000 milliLiter(s) (125 mL/Hr) IV Continuous <Continuous>  mifepristone 200 milliGRAM(s) Oral once  morphine  - Injectable 2 milliGRAM(s) IV Push once  sodium chloride 0.9% lock flush 3 milliLiter(s) IV Push every 8 hours      Labs:  Blood type: O Positive  Rubella IgG: RPR: Negative                          13.1   17.48<H> >-----------< 285    (  @ 06:14 )             39.5                        14.6   15.69<H> >-----------< 329    ( 11-15 @ 17:00 )             44.2    20 @ 06:14      136  |  102  |  7   ----------------------------<  80  4.1   |  23  |  0.75        Ca    9.8      2020 06:14    TPro  6.3  /  Alb  3.7  /  TBili  0.6  /  DBili  x   /  AST  14  /  ALT  11  /  AlkPhos  53  20 @ 06:14          Physical Exam:  General: NAD  Abdomen: soft, nontender  Vaginal: Lochia scant  Extremities: No erythema/edema    A/P: 31yo PPD#1 s/p  vaginal delivery of 17 week inevitable after presenting with cerclage and increased pressure   - Patient seen by SW and stable for discharge  _ spoke with Dr Mendez and made her aware of the situation wicho the need for follow up    Heide Alvarez M.D., M.B.A., M.S. Associate Chief of L & D (Late entry)     I have not met this patient before today.  She was admitted by Dr Langford and delivered by Dr Smamy Ortiz.  Upon review of the chart she presented with increased pressure and when the cerclage was removed she was dilated and went on have a vagfinal delivery    OB Progress Note:  PPD#1    S: 29yo  PPD#1 s/p . Patient was a little tearful this morning    O:  Vitals:  Vital Signs Last 24 Hrs  T(C): 37.2 (2020 09:23), Max: 37.5 (15 Nov 2020 14:30)  T(F): 99 (2020 09:23), Max: 99.5 (15 Nov 2020 14:30)  HR: 89 (2020 09:23) (71 - 116)  BP: 112/58 (2020 09:23) (109/53 - 153/74)  RR: 17 (2020 09:23) (15 - 18)  SpO2: 97% (2020 09:23) (93% - 100%)    MEDICATIONS  (STANDING):  acetaminophen   Tablet .. 975 milliGRAM(s) Oral <User Schedule>  ertapenem  IVPB      ertapenem  IVPB 1000 milliGRAM(s) IV Intermittent every 24 hours  ibuprofen  Tablet. 600 milliGRAM(s) Oral every 6 hours  influenza   Vaccine 0.5 milliLiter(s) IntraMuscular once  lactated ringers. 1000 milliLiter(s) (125 mL/Hr) IV Continuous <Continuous>  mifepristone 200 milliGRAM(s) Oral once  morphine  - Injectable 2 milliGRAM(s) IV Push once  sodium chloride 0.9% lock flush 3 milliLiter(s) IV Push every 8 hours      Labs:  Blood type: O Positive  Rubella IgG: RPR: Negative                          13.1   17.48<H> >-----------< 285    (  @ 06:14 )             39.5                        14.6   15.69<H> >-----------< 329    ( 11-15 @ 17:00 )             44.2    20 @ 06:14      136  |  102  |  7   ----------------------------<  80  4.1   |  23  |  0.75        Ca    9.8      2020 06:14    TPro  6.3  /  Alb  3.7  /  TBili  0.6  /  DBili  x   /  AST  14  /  ALT  11  /  AlkPhos  53  20 @ 06:14          Physical Exam:  General: NAD  Abdomen: soft, nontender  Vaginal: Lochia scant  Extremities: No erythema/edema    A/P: 29yo PPD#1 s/p  vaginal delivery of 17 week inevitable after presenting with cerclage and increased pressure with suspected intraamniotic infection  - Patient seen by SW and stable for discharge  _ spoke with Dr Mendez and made her aware of the situation wicho the need for follow up    Heide Alvarez M.D., M.B.A., M.S.

## 2020-11-16 NOTE — CHART NOTE - NSCHARTNOTEFT_GEN_A_CORE
R3 note    Placenta delivered. Examined and intact.   Sono performed with thin endometrial stripe.   No lacerations noted.   Patient pain controlled  Uterine fundus noted to be firm.     T(C): 36.9 (11-15-20 @ 21:00), Max: 37.5 (11-15-20 @ 14:30)  HR: 85 (20 @ 00:51) (73 - 116)  BP: 133/70 (20 @ 00:42) (120/57 - 153/74)  RR: 18 (11-15-20 @ 16:08) (18 - 18)  SpO2: 100% (20 @ 00:46) (93% - 100%)    Assesment:     31 y/o  @ 17wk5d now s/p  delivery of 17w demise.  Plan  - For am CBC, CMP, Lactate, Coags  DAVID Mullins PGY3  Dr. Sammy Ortiz at bedside

## 2020-11-16 NOTE — DISCHARGE NOTE OB - CARE PLAN
Principal Discharge DX:	Incompetent cervix during second trimester, antepartum  Goal:	Recover  Assessment and plan of treatment:	Make your follow-up appointment with your doctor after discharge . No heavy lifting, driving, or strenuous activity for 6 weeks. Nothing per vagina such as tampons, intercourse, douches, or tub baths for 6 weeks or until you see your doctor. Call your doctor with any signs and symptoms of infection such as fever, chills, nausea, or vomiting. Call your doctor if you're unable to tolerate food, increase in vaginal bleeding, or have difficulty urinating. Call your doctor if you have pain that is not relieved by your prescribed medications. Notify your doctor with any other concerns.   Prior to getting pregnant please see Dr. Barrientos for abdominal cerclage consultation. Please see maternal fetal medicine for consultation.

## 2020-11-16 NOTE — DISCHARGE NOTE OB - CARE PROVIDERS DIRECT ADDRESSES
,DirectAddress_Unknown,[email or web address]

## 2020-11-16 NOTE — DISCHARGE NOTE OB - MEDICATION SUMMARY - MEDICATIONS TO TAKE
I will START or STAY ON the medications listed below when I get home from the hospital:    acetaminophen 325 mg oral tablet  -- 3 tab(s) by mouth   -- Indication: For for pain as needed    ibuprofen 600 mg oral tablet  -- 1 tab(s) by mouth every 6 hours  -- Indication: For for pain as needed

## 2020-11-16 NOTE — DISCHARGE NOTE OB - HOSPITAL COURSE
Patient presented to labor and delivery in  labor with amador cerclage in place. Cerclage removed and patient received cytotec for induction of labor. She was given one dose of Invanz for chorioamnionitis, with uterine tenderness. Patient delivered uncomplicated, received cytotec, hembate and methergine to aid in delivery of placenta. Patient presented to labor and delivery in  labor with amador cerclage in place. Cerclage removed and patient received cytotec for induction of labor. She was given one dose of Invanz for chorioamnionitis, with uterine tenderness. Patient delivered uncomplicated, received cytotec, hembate and methergine to aid in delivery of placenta. EBL:300 Hct:39.5. Patient was transferred to postpartum floor & monitored. Pt was voiding spontaneously with normal vital signs. Patient is medically optimized for discharge on PPD#1 & instructed to follow up with Dr. Rosado  in 6 weeks for postpartum care as well as Dr. Barrientos prior to next conception for abdominal cerclage consultation. Patient presented to labor and delivery in  labor with amador cerclage in place. Cerclage removed and patient received cytotec for induction of labor. She was given one dose of Invanz for chorioamnionitis, with uterine tenderness. Patient delivered uncomplicated, received cytotec, hembate and methergine to aid in delivery of placenta. EBL:300 Hct:39.5. Patient was transferred to postpartum floor & monitored. Pt was voiding spontaneously with normal vital signs. Patient is medically optimized for discharge on PPD#1 & instructed to follow up with Dr. Rosdao  in 6 weeks for postpartum care( the Associate Chief of L & D called and informed Dr Mendez) as well as Dr. Barrientos prior to next conception for abdominal cerclage consultation.

## 2020-11-16 NOTE — DISCHARGE NOTE OB - MEDICATION SUMMARY - MEDICATIONS TO CHANGE
I will SWITCH the dose or number of times a day I take the medications listed below when I get home from the hospital:    Tylenol Extra Strength 500 mg oral tablet  -- 2 tab(s) by mouth every 6 hours    ibuprofen 200 mg oral tablet  -- 3 tab(s) by mouth every 6 hours    PNV Prenatal oral tablet  -- 1 tab(s) by mouth once a day

## 2020-11-30 NOTE — OB RN DELIVERY SUMMARY - APGAR COMPLETED BY
Progress note    Assessment & Plan    Acute respiratory failure status post tracheostomy  Covid 19 pneumonia  ARDS  Enterobacter pneumonia status post antibiotics course  Right pneumothorax status post chest tube  Left hemopneumothorax status post chest tube  Acute kidney injury requiring renal replacement therapy  Acute blood loss anemia secondary to recurrent GI bleed  Covid associated coagulopathy  Acute PE  DM  Acute encephalopathy: Lingering effect of sedation versus hypoactive delirium  Deconditioning  Moderate protein calorie malnutrition    Plan  Continue to monitor on stepdown unit  Vent support as per pulmonary  Follow-up chest x-ray -unchanged from previous note still diffuse infiltrates  Bronchodilator protocol  Bronchial hygiene  Pulmonary following  Completed steroid course  Continue hemodialysis per nephrology  Nephrology following  Continue heparin drip   Hemoglobin so far stable we will continue to monitor  Continue right chest tube to suction, continue left chest tube to waterseal  Continue tube feeds  Sliding scale insulin  DVT prophylaxis  GI prophylaxis  Discharge planning: LTAC once arrangements made    Critical care time spent: 36 minutes       Chief Complaint :   Chief Complaint   Patient presents with   • Shortness of Breath     85% RA       HPI :  Patient seen and examined today laying in bed comfortable patient is drowsy but arousable still on vent     History:  Past Medical History:   Diagnosis Date   • Diabetes mellitus (CMS/HCC)      History reviewed. No pertinent surgical history.    Allergies:  ALLERGIES:  Patient has no known allergies.    Home Meds:  Medications Prior to Admission   Medication Sig Dispense Refill   • lisinopril (ZESTRIL) 5 MG tablet Take 5 mg by mouth daily.     • atorvastatin (LIPITOR) 10 MG tablet Take 10 mg by mouth daily.     • allopurinol (ZYLOPRIM) 300 MG tablet Take 300 mg by mouth daily.     • metFORMIN (GLUCOPHAGE) 500 MG tablet Take 500 mg by mouth 2 times  daily.     • Cholecalciferol (Vitamin D) 50 mcg (2,000 units) tablet Take 2,000 Units by mouth daily.     • cyclobenzaprine (FLEXERIL) 5 MG tablet Take 5 mg by mouth 2 times daily.       Immunizations & Other History:  Immunization History   Administered Date(s) Administered   • None   Deferred Date(s) Deferred   • Pneumococcal polysaccharide, adult, 23 valent 10/22/2020       Social History     Socioeconomic History   • Marital status: /Civil Union     Spouse name: Not on file   • Number of children: Not on file   • Years of education: Not on file   • Highest education level: Not on file   Occupational History   • Not on file   Social Needs   • Financial resource strain: Not on file   • Food insecurity     Worry: Not on file     Inability: Not on file   • Transportation needs     Medical: Not on file     Non-medical: Not on file   Tobacco Use   • Smoking status: Never Smoker   • Smokeless tobacco: Never Used   Substance and Sexual Activity   • Alcohol use: Not on file   • Drug use: Not on file   • Sexual activity: Not on file   Lifestyle   • Physical activity     Days per week: Not on file     Minutes per session: Not on file   • Stress: Not on file   Relationships   • Social connections     Talks on phone: Not on file     Gets together: Not on file     Attends Anabaptist service: Not on file     Active member of club or organization: Not on file     Attends meetings of clubs or organizations: Not on file     Relationship status: Not on file   • Intimate partner violence     Fear of current or ex partner: Not on file     Emotionally abused: Not on file     Physically abused: Not on file     Forced sexual activity: Not on file   Other Topics Concern   • Not on file   Social History Narrative   • Not on file     History reviewed. No pertinent family history.    Review of Systems  Constitutional: negative except as per HPI  Eyes: negative except as per HPI  Ears, nose, mouth, throat, and face: negative except as  per HPI  Respiratory: negative except as per HPI  Cardiovascular: negative except as per HPI  Gastrointestinal: negative except as per HPI  Genitourinary:negative except as per HPI  Integument/breast: negative except as per HPI  Hematologic/lymphatic: negative except as per HPI  Musculoskeletal:negative except as per HPI  Neurological: negative except as per HPI  Behavioral/Psych: negative except as per HPI  Endocrine: negative except as per HPI  Allergic/Immunologic: negative except as per HPI    Objective   Vitals Ranges and Last Value:  Temp:  [98.1 °F (36.7 °C)-98.8 °F (37.1 °C)] 98.1 °F (36.7 °C)  Heart Rate:  [] 104  Resp:  [24-43] 29  BP: (101-131)/(55-81) 112/66  FiO2 (%):  [50 %] 50 %   Weight change:     Physical Exam:    General Appearance:    Alert, cooperative, no distress, appears stated age   Head:    Normocephalic, without obvious abnormality, atraumatic   Eyes:    PERRL, conjunctiva/corneas clear, EOM's intact, fundi     benign, both eyes        Ears:    Normal TM's and external ear canals, both ears   Nose:   Nares normal, septum midline, mucosa normal, no drainage    or sinus tenderness   Throat:   Lips, mucosa, and tongue normal; teeth and gums normal   Neck:   Supple, symmetrical, trachea midline, no adenopathy;        thyroid:  No enlargement/tenderness/nodules; no carotid    bruit or JVD, trach in place site is dry and clean   Back:     Symmetric, no curvature, ROM normal, no CVA tenderness   Lungs:    Decreased breath sounds at the bases, respirations unlabored   Chest wall:    No tenderness or deformity   Heart:    Regular rate and rhythm, S1 and S2 normal, no murmur, rub   or gallop   Abdomen:     Soft, non-tender, bowel sounds active all four quadrants,     no masses, no organomegaly   Extremities:   Extremities normal, atraumatic, no cyanosis or edema   Pulses:   2+ and symmetric all extremities   Skin:   Skin color, texture, turgor normal, no rashes or lesions   Lymph nodes:    Cervical, supraclavicular, and axillary nodes normal   Neurologic:   CNII-XII intact. Normal strength, sensation and reflexes       throughout       Inpatient Medications:  Scheduled  • insulin glargine  14 Units Subcutaneous 2 times per day   • midodrine  5 mg Per NG tube TID AC   • famotidine  20 mg Per NG tube Daily   • insulin lispro   Subcutaneous 4 times per day     Infusions  • heparin (porcine) 25,000 units/250 mL in dextrose 5 % infusion 24 Units/kg/hr (11/29/20 1638)   • sodium chloride 0.9% infusion 25 mL/hr at 11/23/20 0100   • dextrose 5 % infusion       PRN  docusate sodium-sennosides, sodium chloride (PF), sodium chloride (PF), sodium chloride, heparin (porcine), heparin (porcine), sodium chloride, dextrose, dextrose, dextrose, glucagon, dextrose, dextrose, alteplase, midodrine, acetaminophen, sodium chloride     Results:  Labs:   Lab Results   Component Value Date    WBC 10.9 11/30/2020    HGB 7.4 (L) 11/30/2020    HCT 25.7 (L) 11/30/2020     11/30/2020     Lab Results   Component Value Date    CO2 PENDING 11/30/2020    BUN PENDING 11/30/2020    CREATININE PENDING 11/30/2020    GLUCOSE PENDING 11/30/2020     Lab Results   Component Value Date    CALCIUM PENDING 11/30/2020    MG 2.6 (H) 11/29/2020    PHOS 6.0 (H) 11/29/2020       Sanchez Dickey MD   Nurse/JOE Hebert RN

## 2021-01-19 PROBLEM — Z33.2 ENCOUNTER FOR ELECTIVE TERMINATION OF PREGNANCY: Chronic | Status: ACTIVE | Noted: 2020-11-15

## 2021-01-19 PROBLEM — A74.9 CHLAMYDIAL INFECTION, UNSPECIFIED: Chronic | Status: ACTIVE | Noted: 2020-11-15

## 2021-01-23 LAB — SURGICAL PATHOLOGY STUDY: SIGNIFICANT CHANGE UP

## 2021-02-19 ENCOUNTER — RESULT REVIEW (OUTPATIENT)
Age: 31
End: 2021-02-19

## 2021-02-25 ENCOUNTER — APPOINTMENT (OUTPATIENT)
Dept: OBGYN | Facility: CLINIC | Age: 31
End: 2021-02-25
Payer: COMMERCIAL

## 2021-02-25 PROCEDURE — 99203 OFFICE O/P NEW LOW 30 MIN: CPT

## 2021-02-25 PROCEDURE — 99072 ADDL SUPL MATRL&STAF TM PHE: CPT

## 2021-03-18 ENCOUNTER — APPOINTMENT (OUTPATIENT)
Dept: MRI IMAGING | Facility: CLINIC | Age: 31
End: 2021-03-18

## 2021-03-30 ENCOUNTER — RESULT REVIEW (OUTPATIENT)
Age: 31
End: 2021-03-30

## 2021-03-30 ENCOUNTER — TRANSCRIPTION ENCOUNTER (OUTPATIENT)
Age: 31
End: 2021-03-30

## 2021-03-30 ENCOUNTER — APPOINTMENT (OUTPATIENT)
Dept: MRI IMAGING | Facility: CLINIC | Age: 31
End: 2021-03-30
Payer: COMMERCIAL

## 2021-03-30 ENCOUNTER — OUTPATIENT (OUTPATIENT)
Dept: OUTPATIENT SERVICES | Facility: HOSPITAL | Age: 31
LOS: 1 days | End: 2021-03-30
Payer: COMMERCIAL

## 2021-03-30 DIAGNOSIS — Z00.8 ENCOUNTER FOR OTHER GENERAL EXAMINATION: ICD-10-CM

## 2021-03-30 DIAGNOSIS — O34.30 MATERNAL CARE FOR CERVICAL INCOMPETENCE, UNSPECIFIED TRIMESTER: Chronic | ICD-10-CM

## 2021-03-30 DIAGNOSIS — Z98.890 OTHER SPECIFIED POSTPROCEDURAL STATES: Chronic | ICD-10-CM

## 2021-03-30 PROCEDURE — A9585: CPT

## 2021-03-30 PROCEDURE — 72197 MRI PELVIS W/O & W/DYE: CPT | Mod: 26

## 2021-03-30 PROCEDURE — 72197 MRI PELVIS W/O & W/DYE: CPT

## 2021-04-02 ENCOUNTER — NON-APPOINTMENT (OUTPATIENT)
Age: 31
End: 2021-04-02

## 2021-04-08 ENCOUNTER — OUTPATIENT (OUTPATIENT)
Dept: OUTPATIENT SERVICES | Facility: HOSPITAL | Age: 31
LOS: 1 days | End: 2021-04-08
Payer: COMMERCIAL

## 2021-04-08 VITALS
WEIGHT: 153.22 LBS | TEMPERATURE: 99 F | DIASTOLIC BLOOD PRESSURE: 85 MMHG | OXYGEN SATURATION: 99 % | HEART RATE: 72 BPM | RESPIRATION RATE: 16 BRPM | HEIGHT: 63 IN | SYSTOLIC BLOOD PRESSURE: 127 MMHG

## 2021-04-08 DIAGNOSIS — N81.82 INCOMPETENCE OR WEAKENING OF PUBOCERVICAL TISSUE: ICD-10-CM

## 2021-04-08 DIAGNOSIS — Z01.818 ENCOUNTER FOR OTHER PREPROCEDURAL EXAMINATION: ICD-10-CM

## 2021-04-08 DIAGNOSIS — O34.30 MATERNAL CARE FOR CERVICAL INCOMPETENCE, UNSPECIFIED TRIMESTER: Chronic | ICD-10-CM

## 2021-04-08 DIAGNOSIS — Z98.890 OTHER SPECIFIED POSTPROCEDURAL STATES: Chronic | ICD-10-CM

## 2021-04-08 DIAGNOSIS — N88.3 INCOMPETENCE OF CERVIX UTERI: ICD-10-CM

## 2021-04-08 LAB
ANION GAP SERPL CALC-SCNC: 11 MMOL/L — SIGNIFICANT CHANGE UP (ref 5–17)
BUN SERPL-MCNC: 10 MG/DL — SIGNIFICANT CHANGE UP (ref 7–23)
CALCIUM SERPL-MCNC: 9.6 MG/DL — SIGNIFICANT CHANGE UP (ref 8.4–10.5)
CHLORIDE SERPL-SCNC: 107 MMOL/L — SIGNIFICANT CHANGE UP (ref 96–108)
CO2 SERPL-SCNC: 23 MMOL/L — SIGNIFICANT CHANGE UP (ref 22–31)
CREAT SERPL-MCNC: 0.81 MG/DL — SIGNIFICANT CHANGE UP (ref 0.5–1.3)
GLUCOSE SERPL-MCNC: 67 MG/DL — LOW (ref 70–99)
HCT VFR BLD CALC: 42.7 % — SIGNIFICANT CHANGE UP (ref 34.5–45)
HGB BLD-MCNC: 14 G/DL — SIGNIFICANT CHANGE UP (ref 11.5–15.5)
MCHC RBC-ENTMCNC: 25.9 PG — LOW (ref 27–34)
MCHC RBC-ENTMCNC: 32.8 GM/DL — SIGNIFICANT CHANGE UP (ref 32–36)
MCV RBC AUTO: 79.1 FL — LOW (ref 80–100)
NRBC # BLD: 0 /100 WBCS — SIGNIFICANT CHANGE UP (ref 0–0)
PLATELET # BLD AUTO: 420 K/UL — HIGH (ref 150–400)
POTASSIUM SERPL-MCNC: 5.2 MMOL/L — SIGNIFICANT CHANGE UP (ref 3.5–5.3)
POTASSIUM SERPL-SCNC: 5.2 MMOL/L — SIGNIFICANT CHANGE UP (ref 3.5–5.3)
RBC # BLD: 5.4 M/UL — HIGH (ref 3.8–5.2)
RBC # FLD: 15.2 % — HIGH (ref 10.3–14.5)
SODIUM SERPL-SCNC: 141 MMOL/L — SIGNIFICANT CHANGE UP (ref 135–145)
WBC # BLD: 9.59 K/UL — SIGNIFICANT CHANGE UP (ref 3.8–10.5)
WBC # FLD AUTO: 9.59 K/UL — SIGNIFICANT CHANGE UP (ref 3.8–10.5)

## 2021-04-08 PROCEDURE — 80048 BASIC METABOLIC PNL TOTAL CA: CPT

## 2021-04-08 PROCEDURE — 85027 COMPLETE CBC AUTOMATED: CPT

## 2021-04-08 PROCEDURE — G0463: CPT

## 2021-04-08 RX ORDER — CEFOTETAN DISODIUM 1 G
2 VIAL (EA) INJECTION ONCE
Refills: 0 | Status: DISCONTINUED | OUTPATIENT
Start: 2021-04-22 | End: 2021-05-06

## 2021-04-08 RX ORDER — LIDOCAINE HCL 20 MG/ML
0.2 VIAL (ML) INJECTION ONCE
Refills: 0 | Status: DISCONTINUED | OUTPATIENT
Start: 2021-04-22 | End: 2021-05-06

## 2021-04-08 RX ORDER — CHLORHEXIDINE GLUCONATE 213 G/1000ML
1 SOLUTION TOPICAL ONCE
Refills: 0 | Status: DISCONTINUED | OUTPATIENT
Start: 2021-04-22 | End: 2021-05-06

## 2021-04-08 RX ORDER — SODIUM CHLORIDE 9 MG/ML
3 INJECTION INTRAMUSCULAR; INTRAVENOUS; SUBCUTANEOUS EVERY 8 HOURS
Refills: 0 | Status: DISCONTINUED | OUTPATIENT
Start: 2021-04-22 | End: 2021-05-06

## 2021-04-08 NOTE — H&P PST ADULT - NSICDXPASTMEDICALHX_GEN_ALL_CORE_FT
PAST MEDICAL HISTORY:  Chlamydia 2017     labor SAB 19 wks 2020    Termination of pregnancy (fetus) x 2     PAST MEDICAL HISTORY:  Chlamydia 2017    Incompetent cervix      labor SAB 19 wks 2020 and at 17 5/7 weeks 2020    Termination of pregnancy (fetus) x 2

## 2021-04-08 NOTE — H&P PST ADULT - HISTORY OF PRESENT ILLNESS
32 yo  with h/o incompetent cervix s/p  labor at 17 5/7 weeks 2020( with Kelly Cerclage in place) and prior to that at 19.2 weeks 2020. Pt is scheduled for Laparoscopic Abdominal Cerclage on 2021.    Pt is scheduled for Covid-19 PCR on 2021 at [facility].

## 2021-04-08 NOTE — H&P PST ADULT - NSICDXPROBLEM_GEN_ALL_CORE_FT
PROBLEM DIAGNOSES  Problem: Incompetent cervix  Assessment and Plan: Laparoscopic Abdominal Cerclage  Stat UCG on admission

## 2021-04-08 NOTE — H&P PST ADULT - NSANTHOSAYNRD_GEN_A_CORE
No. MARIA screening performed.  STOP BANG Legend: 0-2 = LOW Risk; 3-4 = INTERMEDIATE Risk; 5-8 = HIGH Risk

## 2021-04-08 NOTE — H&P PST ADULT - NSICDXPASTSURGICALHX_GEN_ALL_CORE_FT
PAST SURGICAL HISTORY:  Cervical cerclage suture present 10/13/20    History of D&C 2019 s/p TOP     PAST SURGICAL HISTORY:  Cervical cerclage suture present placed 10/13/20, removed 11/2020    History of D&C 2009, 2011 - D&C

## 2021-04-15 PROBLEM — O60.00 PRETERM LABOR WITHOUT DELIVERY, UNSPECIFIED TRIMESTER: Chronic | Status: ACTIVE | Noted: 2020-11-15

## 2021-04-15 PROBLEM — N88.3 INCOMPETENCE OF CERVIX UTERI: Chronic | Status: ACTIVE | Noted: 2021-04-08

## 2021-04-19 ENCOUNTER — OUTPATIENT (OUTPATIENT)
Dept: OUTPATIENT SERVICES | Facility: HOSPITAL | Age: 31
LOS: 1 days | End: 2021-04-19
Payer: COMMERCIAL

## 2021-04-19 DIAGNOSIS — Z98.890 OTHER SPECIFIED POSTPROCEDURAL STATES: Chronic | ICD-10-CM

## 2021-04-19 DIAGNOSIS — Z11.52 ENCOUNTER FOR SCREENING FOR COVID-19: ICD-10-CM

## 2021-04-19 DIAGNOSIS — O34.30 MATERNAL CARE FOR CERVICAL INCOMPETENCE, UNSPECIFIED TRIMESTER: Chronic | ICD-10-CM

## 2021-04-19 LAB — SARS-COV-2 RNA SPEC QL NAA+PROBE: SIGNIFICANT CHANGE UP

## 2021-04-19 PROCEDURE — C9803: CPT

## 2021-04-19 PROCEDURE — U0003: CPT

## 2021-04-19 PROCEDURE — U0005: CPT

## 2021-04-21 ENCOUNTER — TRANSCRIPTION ENCOUNTER (OUTPATIENT)
Age: 31
End: 2021-04-21

## 2021-04-22 ENCOUNTER — APPOINTMENT (OUTPATIENT)
Dept: OBGYN | Facility: HOSPITAL | Age: 31
End: 2021-04-22

## 2021-04-22 ENCOUNTER — OUTPATIENT (OUTPATIENT)
Dept: OUTPATIENT SERVICES | Facility: HOSPITAL | Age: 31
LOS: 1 days | End: 2021-04-22
Payer: COMMERCIAL

## 2021-04-22 VITALS
TEMPERATURE: 98 F | HEIGHT: 63 IN | RESPIRATION RATE: 16 BRPM | WEIGHT: 153 LBS | HEART RATE: 70 BPM | DIASTOLIC BLOOD PRESSURE: 77 MMHG | SYSTOLIC BLOOD PRESSURE: 124 MMHG | OXYGEN SATURATION: 100 %

## 2021-04-22 VITALS
RESPIRATION RATE: 16 BRPM | HEART RATE: 72 BPM | DIASTOLIC BLOOD PRESSURE: 80 MMHG | OXYGEN SATURATION: 99 % | SYSTOLIC BLOOD PRESSURE: 131 MMHG

## 2021-04-22 DIAGNOSIS — O34.30 MATERNAL CARE FOR CERVICAL INCOMPETENCE, UNSPECIFIED TRIMESTER: Chronic | ICD-10-CM

## 2021-04-22 DIAGNOSIS — N81.82 INCOMPETENCE OR WEAKENING OF PUBOCERVICAL TISSUE: ICD-10-CM

## 2021-04-22 DIAGNOSIS — Z98.890 OTHER SPECIFIED POSTPROCEDURAL STATES: Chronic | ICD-10-CM

## 2021-04-22 DIAGNOSIS — Z09 ENCOUNTER FOR FOLLOW-UP EXAMINATION AFTER COMPLETED TREATMENT FOR CONDITIONS OTHER THAN MALIGNANT NEOPLASM: ICD-10-CM

## 2021-04-22 LAB
BLD GP AB SCN SERPL QL: NEGATIVE — SIGNIFICANT CHANGE UP
HCG UR QL: NEGATIVE — SIGNIFICANT CHANGE UP
RH IG SCN BLD-IMP: POSITIVE — SIGNIFICANT CHANGE UP
RH IG SCN BLD-IMP: POSITIVE — SIGNIFICANT CHANGE UP

## 2021-04-22 PROCEDURE — 58578 UNLISTED LAPS PX UTERUS: CPT | Mod: 80

## 2021-04-22 PROCEDURE — 86900 BLOOD TYPING SEROLOGIC ABO: CPT

## 2021-04-22 PROCEDURE — C9399: CPT

## 2021-04-22 PROCEDURE — 86901 BLOOD TYPING SEROLOGIC RH(D): CPT

## 2021-04-22 PROCEDURE — C1889: CPT

## 2021-04-22 PROCEDURE — 86850 RBC ANTIBODY SCREEN: CPT

## 2021-04-22 PROCEDURE — 58578 UNLISTED LAPS PX UTERUS: CPT

## 2021-04-22 PROCEDURE — 58999 UNLISTED PX FML GENITAL SYS: CPT

## 2021-04-22 PROCEDURE — 81025 URINE PREGNANCY TEST: CPT

## 2021-04-22 RX ORDER — HYDROMORPHONE HYDROCHLORIDE 2 MG/ML
0.5 INJECTION INTRAMUSCULAR; INTRAVENOUS; SUBCUTANEOUS
Refills: 0 | Status: DISCONTINUED | OUTPATIENT
Start: 2021-04-22 | End: 2021-04-22

## 2021-04-22 RX ORDER — SODIUM CHLORIDE 9 MG/ML
1000 INJECTION, SOLUTION INTRAVENOUS
Refills: 0 | Status: DISCONTINUED | OUTPATIENT
Start: 2021-04-22 | End: 2021-05-06

## 2021-04-22 RX ORDER — OXYCODONE HYDROCHLORIDE 5 MG/1
1 TABLET ORAL
Qty: 5 | Refills: 0
Start: 2021-04-22

## 2021-04-22 RX ORDER — SODIUM CHLORIDE 9 MG/ML
1000 INJECTION, SOLUTION INTRAVENOUS
Refills: 0 | Status: DISCONTINUED | OUTPATIENT
Start: 2021-04-22 | End: 2021-04-22

## 2021-04-22 NOTE — PROGRESS NOTE ADULT - ASSESSMENT
A/P: 31y Female S/P laparpscopic abdominal cerclage  PAST MEDICAL & SURGICAL HISTORY:   labor  SAB 19 wks 2020 and at 17 5/7 weeks 2020    Termination of pregnancy (fetus)  x 2    Chlamydia  2017    Incompetent cervix    History of D&amp;C  ,  - D&amp;C    Cervical cerclage suture present  placed 10/13/20, removed 2020

## 2021-04-22 NOTE — ASU DISCHARGE PLAN (ADULT/PEDIATRIC) - CARE PROVIDER_API CALL
Rivera Barrientos)  Obstetrics and Gynecology  [address] [place] [address], [address]  [place] [address]  Phone: [phone]  Fax: [phone]  Follow Up Time:

## 2021-04-22 NOTE — PRE-ANESTHESIA EVALUATION ADULT - NSANTHTIREDRD_ENT_A_CORE
DISCHARGE PLANNING ASSESSMENT    ADMIT DATE:  2020    ADMIT DIAGNOSIS:  Seizure-like activity [R56.9]  Seizure-like activity [R56.9]  Seizure-like activity [R56.9]    SW met with patient's mother, Malathi, at the bedside to complete discharge planning assessment. Explained role of  and case management.  Mother verbalized understanding. Patient's payor is Medicaid. SW will continue to follow for discharge needs.    This SW completed Social Work Consult. SW met with patient's mother at bedside. Upon SW entering room mother is laying in bed awake with baby next to her. Baby lives at home with Mother, Twin Brother ([place]), Grandmother, Uncle and Cousin (14yo). Baby has car seat at bedside and mother confirms that she has another car seat for son. Patient has transportation home with family. Patient's father (Deep Rowan) is currently incarcerated. He has been incarcerated since April 3 and will have pre-trial in December. Mom has an eight month old that passed away on 2020 selvin to SIDS. Mom receives support from her mother. Mom reports that baby is on Similac Advanced Formula and that she gives baby 2-3oz every 3 hours. SW reiterated to mom how important it is to feed baby every 3 hours and not let her sleep for long periods of time without feeding her. Mom states that yesterday was the first poopy diaper that she has changed. She stated that her mother changes all of the babies poopy diapers. Mom reports that changing a babies diaper who is this small makes her nervous. SW witnessed mother change baby's diaper. Mother wiped baby correctly and placed diaper cream on baby. Baby began to cry and become fussy. SW asked mom when was the last time that baby ate and mother stated she ate at 7. SW informed mom that it is now 10:15 so she needs to offer baby bottle. Mom then fed baby. SW confirmed that mom has a crib at home for the babies. SW reiterated how dangerous it is to co sleep and that when she  No is feeling tired she needs place the babies safely in their crib.   SW made DCFS (report #: [id number]) report due to reports of mom co sleeping overnight at the hospital, even after being told to place baby in crib and also due to reports from the ED of mom stating that baby slept for 11 hours without being fed. DCFS report should not prevent baby from discharging.      PATIENT'S ADDRESS:  [address] [place] [address] [address] [place] [id number]    PCP:  Alexandr Holt MD  [phone]    PHARMACY:   [facility] [address]  [address]   [place] [address]  Phone: [phone] Fax: [phone]    PAYOR: Payor: MEDICAID / Plan: [facility] COMMUNITY PLAN [facility] ([place] MEDICAID) / Product Type: Managed Medicaid /      11/25/20 1103   Discharge Assessment   Assessment Type Discharge Planning Assessment   Confirmed/corrected address and phone number on facesheet? Yes   Assessment information obtained from? Caregiver  (Malathi Bhatia [phone])   Prior to hospitilization cognitive status: Infant/Toddler   Prior to hospitalization functional status: Infant/Toddler/Child Appropriate   Current cognitive status: Infant/Toddler   Current Functional Status: Infant/Toddler/Child Appropriate   Lives With parent(s);sibling(s);other relative(s)   Able to Return to Prior Arrangements yes   Is patient able to care for self after discharge? No;Patient is of pediatric age   Who are your caregiver(s) and their phone number(s)? MotherMalathi: [phone]   Patient currently being followed by outpatient case management? No   Patient currently receives any other outside agency services? No   Equipment Currently Used at Home none   Do you have any problems affording any of your prescribed medications? No   Does the patient have transportation home? Yes   Transportation Anticipated family or friend will provide;health plan transportation   Discharge Plan A Home with family   Discharge Plan B Home with family   DME Needed Upon  Discharge  none   Patient/Family in Agreement with Plan yes     Julieta Newman LCSW  Pediatric Social Worker   [facility]  [id number]

## 2021-04-22 NOTE — PROGRESS NOTE ADULT - PROBLEM SELECTOR PLAN 1
-Neuro - AAO x 3, Pain well controlled   -Heme - hemodynamically stable  -CV - asymptomatic,   -Pulm - encourage IS, O2sat   -GI - Diet-  Regular   Advance as Tolerated  - -  Due to void  -DVT prophylaxis - SCDs in place, encourage ambulation  -Meds:   cefoTEtan  IVPB 2 Gram(s) IV Intermittent once  chlorhexidine 2% Cloths 1 Application(s) Topical once  HYDROmorphone  Injectable 0.5 milliGRAM(s) IV Push every 10 minutes PRN  lactated ringers. 1000 milliLiter(s) IV Continuous <Continuous>  lactated ringers. 1000 milliLiter(s) IV Continuous <Continuous>  lidocaine 1% Injectable 0.2 milliLiter(s) Local Injection once PRN  sodium chloride 0.9% lock flush 3 milliLiter(s) IV Push every 8 hours    -Dispo: stable for discharge from PACU, once meeting all PACU milestones

## 2021-04-22 NOTE — BRIEF OPERATIVE NOTE - PRIMARY SURGEON
PRE-SEDATION ASSESSMENT    CONSENT  Consent for procedure and sedation obtained: Yes    MEDICAL HISTORY  Significant medical/surgical history: No  Past Complications with Sedation/Anesthesia: No  Significant Family History: No  Smoking History: No  Alcohol/Drug abuse: No  Possible Pregnancy (LMP): No  Cardiac History: No  Respiratory History: No    PHYSICAL EXAM  History and Physical Reviewed: H&P completed today  Airway Risk History: No previous history  Airway Anatomy : Class I  Heart : Normal  Lungs : Normal  LOC/Mental Status : Normal    OTHER FINDINGS  Reviewed current medications and allergies: Yes  Pertinent lab/diagnostic test reviewed: Yes    SEDATION RISK ASSESSMENT  Risk Status ASA: Class I - Normal, healthy patient  Plan for Sedation: Moderate Sedation  Indications for Procedure/Pre-Procedure Diagnosis and Planned Procedure: Urgency, weight loss, diarrhea, intermittent rectal bleeding.  EKG Monitoring: Yes    NARRATIVE FINDINGS     
Floyd

## 2021-04-22 NOTE — ASU DISCHARGE PLAN (ADULT/PEDIATRIC) - ASU DC SPECIAL INSTRUCTIONSFT
Regular diet as tolerated, regular activity as tolerated, no heavy lifting until cleared by your physician.    Nothing per vagina: no intercourse, tampons or douching.  No submerging.     Call your provider if you experience fevers, chills, worsening abdominal pain, inability to urinate or heavy vaginal bleeding.    Follow up in the office in 2 weeks for your post-op check.

## 2021-04-22 NOTE — BRIEF OPERATIVE NOTE - OPERATION/FINDINGS
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

## 2021-04-22 NOTE — PROGRESS NOTE ADULT - SUBJECTIVE AND OBJECTIVE BOX
POST-OP CHECK  PA Note    Allergies    No Known Drug Allergies  Seasonal Allergies (Rhinorrhea)    Intolerances        S: Pt awake and alert sitting comfortably in chair, pt reports some nausea after drinking but tolerating and desires more po  Pain controlled. Pt denies V, SOB, CP, palpitations.  neg Flatus  pos PO   due to Void    O:   T(C): 36.4 (04-22-21 @ 14:43), Max: 36.4 (04-22-21 @ 14:43)  HR: 64 (04-22-21 @ 16:15) (64 - 77)  BP: 133/84 (04-22-21 @ 16:15) (132/71 - 140/78)  RR: 16 (04-22-21 @ 16:15) (14 - 16)  SpO2: 99% (04-22-21 @ 16:15) (98% - 100%)  Wt(kg): --  I&O's Summary                       OR           PACU  (I)                              IVF LR@125  (O)  EBL    CV: RRR  Lungs: CTA B/L  Abd: +BS, soft, appropriately tender  Inc: Clean/dry/intact x4 opsite  Ext: neg edema, Neg Homans B/L

## 2021-04-27 NOTE — OB RN PATIENT PROFILE - BREAST MILK IS MORE DIGESTIBLE, MAKING VOMITING, DIARRHEA, GAS AND CONSTIPATION LESS COMMON
Render In Strict Bullet Format?: No Detail Level: Simple Plan: Kenalog injection given today Statement Selected

## 2021-05-06 ENCOUNTER — APPOINTMENT (OUTPATIENT)
Dept: OBGYN | Facility: CLINIC | Age: 31
End: 2021-05-06

## 2021-06-26 ENCOUNTER — TRANSCRIPTION ENCOUNTER (OUTPATIENT)
Age: 31
End: 2021-06-26

## 2022-01-25 NOTE — PERINATAL/NEONATAL BEREAVEMENT NOTE - NS PERI BEREAVEMOTHERTOUCHDONE
[Today's Date] : [unfilled] [FreeTextEntry1] : Sinus rhythm. Normal QRS axis. Normal intervals. No hypertrophy, no pre-excitation, no ST segment or T wave abnormalities. Normal EKG.\par  [FreeTextEntry2] : Normal segmental anatomy, normally-related great vessels. No septal defects or PDA. No significant valvar regurgitation (trivial, physiologic TR/PI), stenosis, or outflow obstruction. No ventricular hypertrophy. Normal biventricular function. Normal origins of the coronary arteries. Normal aortic arch and descending aortic Doppler tracing. No significant pericardial effusion.\par  yes

## 2022-04-04 NOTE — ED ADULT TRIAGE NOTE - AS HEIGHT TYPE
Detail Level: Detailed Depth Of Biopsy: dermis Was A Bandage Applied: Yes Size Of Lesion In Cm: 0 Biopsy Type: H and E Biopsy Method: Personna blade Anesthesia Type: 1% lidocaine with 1:100,000 epinephrine and a 1:12 solution of 8.4% sodium bicarbonate stated Anesthesia Volume In Cc (Will Not Render If 0): 0.5 Hemostasis: Drysol Wound Care: Vaseline Dressing: pressure dressing with telfa Destruction After The Procedure: No Type Of Destruction Used: Curettage Curettage Text: The wound bed was treated with curettage after the biopsy was performed. Cryotherapy Text: The wound bed was treated with cryotherapy after the biopsy was performed. Electrodesiccation Text: The wound bed was treated with electrodesiccation after the biopsy was performed. Electrodesiccation And Curettage Text: The wound bed was treated with electrodesiccation and curettage after the biopsy was performed. Silver Nitrate Text: The wound bed was treated with silver nitrate after the biopsy was performed. Lab: [id number] Lab Facility: [id number] Consent: Verbal consent was obtained and risks were reviewed including but not limited to scarring, infection, bleeding, scabbing, incomplete removal, nerve damage and allergy to anesthesia. Post-Care Instructions: I reviewed with the patient in detail post-care instructions. Patient is to keep the biopsy site dry overnight, and then apply bacitracin twice daily until healed. Patient may apply hydrogen peroxide soaks to remove any crusting. Notification Instructions: Patient will be notified of biopsy results. However, patient instructed to call the office if not contacted within 2 weeks. Billing Type: Third-Party Bill Information: Selecting Yes will display possible errors in your note based on the variables you have selected. This validation is only offered as a suggestion for you. PLEASE NOTE THAT THE VALIDATION TEXT WILL BE REMOVED WHEN YOU FINALIZE YOUR NOTE. IF YOU WANT TO FAX A PRELIMINARY NOTE YOU WILL NEED TO TOGGLE THIS TO 'NO' IF YOU DO NOT WANT IT IN YOUR FAXED NOTE.

## 2022-07-26 NOTE — OB RN PATIENT PROFILE - BREAST MILK PROVIDES COLOSTRUM THAT IS HIGH IN PROTEIN
I spoke with patient and answered his questions and then gave him an appt for a pre-op with Dr. Wil Grimes to discuss sx. He will choose a date for the sx when he comes for his pre-op. He voiced understanding.
Pt wants to talk about surgery and he also wants to know  How long it will be before he can play golf
Statement Selected

## 2022-12-16 ENCOUNTER — NON-APPOINTMENT (OUTPATIENT)
Age: 32
End: 2022-12-16

## 2023-01-17 ENCOUNTER — APPOINTMENT (OUTPATIENT)
Dept: OBGYN | Facility: CLINIC | Age: 33
End: 2023-01-17

## 2023-04-18 ENCOUNTER — APPOINTMENT (OUTPATIENT)
Dept: OBGYN | Facility: CLINIC | Age: 33
End: 2023-04-18
Payer: COMMERCIAL

## 2023-04-18 VITALS
SYSTOLIC BLOOD PRESSURE: 126 MMHG | DIASTOLIC BLOOD PRESSURE: 74 MMHG | WEIGHT: 168 LBS | BODY MASS INDEX: 29.77 KG/M2 | HEIGHT: 63 IN

## 2023-04-18 DIAGNOSIS — D58.2 OTHER HEMOGLOBINOPATHIES: ICD-10-CM

## 2023-04-18 PROCEDURE — 36415 COLL VENOUS BLD VENIPUNCTURE: CPT

## 2023-04-18 PROCEDURE — 0500F INITIAL PRENATAL CARE VISIT: CPT

## 2023-04-22 PROBLEM — D58.2 HEMOGLOBIN C TRAIT: Status: ACTIVE | Noted: 2023-04-22

## 2023-04-22 LAB
25(OH)D3 SERPL-MCNC: 27.9 NG/ML
ABO + RH PNL BLD: NORMAL
BACTERIA UR CULT: NORMAL
ESTIMATED AVERAGE GLUCOSE: 105 MG/DL
HBA1C MFR BLD HPLC: 5.3 %
LEAD BLD-MCNC: <1 UG/DL

## 2023-04-22 RX ORDER — FOLIC ACID/MULTIVIT,IRON,MINER 0.4MG-18MG
200 TABLET ORAL
Refills: 0 | Status: ACTIVE | COMMUNITY
Start: 2023-04-22

## 2023-05-10 ENCOUNTER — APPOINTMENT (OUTPATIENT)
Dept: OBGYN | Facility: CLINIC | Age: 33
End: 2023-05-10
Payer: COMMERCIAL

## 2023-05-10 DIAGNOSIS — O09.92 SUPERVISION OF HIGH RISK PREGNANCY, UNSPECIFIED, SECOND TRIMESTER: ICD-10-CM

## 2023-05-10 PROCEDURE — 0502F SUBSEQUENT PRENATAL CARE: CPT

## 2023-05-10 PROCEDURE — 36415 COLL VENOUS BLD VENIPUNCTURE: CPT

## 2023-05-11 ENCOUNTER — NON-APPOINTMENT (OUTPATIENT)
Age: 33
End: 2023-05-11

## 2023-05-11 LAB
25(OH)D3 SERPL-MCNC: 21.7 NG/ML
BASOPHILS # BLD AUTO: 0.04 K/UL
BASOPHILS NFR BLD AUTO: 0.3 %
EOSINOPHIL # BLD AUTO: 0.16 K/UL
EOSINOPHIL NFR BLD AUTO: 1.2 %
HCT VFR BLD CALC: 40.9 %
HGB BLD-MCNC: 13.5 G/DL
HIV1+2 AB SPEC QL IA.RAPID: NONREACTIVE
IMM GRANULOCYTES NFR BLD AUTO: 0.8 %
LYMPHOCYTES # BLD AUTO: 2.28 K/UL
LYMPHOCYTES NFR BLD AUTO: 17.4 %
MAN DIFF?: NORMAL
MCHC RBC-ENTMCNC: 27.3 PG
MCHC RBC-ENTMCNC: 33 GM/DL
MCV RBC AUTO: 82.6 FL
MONOCYTES # BLD AUTO: 0.74 K/UL
MONOCYTES NFR BLD AUTO: 5.7 %
NEUTROPHILS # BLD AUTO: 9.76 K/UL
NEUTROPHILS NFR BLD AUTO: 74.6 %
PLATELET # BLD AUTO: 307 K/UL
RBC # BLD: 4.95 M/UL
RBC # FLD: 14.6 %
WBC # FLD AUTO: 13.09 K/UL

## 2023-05-13 LAB
BLD GP AB SCN SERPL QL: NORMAL
GLUCOSE 1H P 50 G GLC PO SERPL-MCNC: 128 MG/DL
T PALLIDUM AB SER QL IA: NEGATIVE

## 2023-05-23 ENCOUNTER — ASOB RESULT (OUTPATIENT)
Age: 33
End: 2023-05-23

## 2023-05-23 ENCOUNTER — APPOINTMENT (OUTPATIENT)
Dept: OBGYN | Facility: CLINIC | Age: 33
End: 2023-05-23
Payer: COMMERCIAL

## 2023-05-23 PROCEDURE — 0502F SUBSEQUENT PRENATAL CARE: CPT

## 2023-05-23 PROCEDURE — 76816 OB US FOLLOW-UP PER FETUS: CPT

## 2023-06-05 ENCOUNTER — APPOINTMENT (OUTPATIENT)
Dept: OBGYN | Facility: CLINIC | Age: 33
End: 2023-06-05
Payer: COMMERCIAL

## 2023-06-05 DIAGNOSIS — Z23 ENCOUNTER FOR IMMUNIZATION: ICD-10-CM

## 2023-06-05 PROCEDURE — 0502F SUBSEQUENT PRENATAL CARE: CPT

## 2023-06-05 PROCEDURE — 90471 IMMUNIZATION ADMIN: CPT

## 2023-06-05 PROCEDURE — 90715 TDAP VACCINE 7 YRS/> IM: CPT

## 2023-06-20 ENCOUNTER — ASOB RESULT (OUTPATIENT)
Age: 33
End: 2023-06-20

## 2023-06-20 ENCOUNTER — APPOINTMENT (OUTPATIENT)
Dept: OBGYN | Facility: CLINIC | Age: 33
End: 2023-06-20
Payer: COMMERCIAL

## 2023-06-20 PROCEDURE — 76816 OB US FOLLOW-UP PER FETUS: CPT

## 2023-06-20 PROCEDURE — 0502F SUBSEQUENT PRENATAL CARE: CPT

## 2023-06-27 ENCOUNTER — NON-APPOINTMENT (OUTPATIENT)
Age: 33
End: 2023-06-27

## 2023-07-05 ENCOUNTER — APPOINTMENT (OUTPATIENT)
Dept: OBGYN | Facility: CLINIC | Age: 33
End: 2023-07-05
Payer: COMMERCIAL

## 2023-07-05 ENCOUNTER — ASOB RESULT (OUTPATIENT)
Age: 33
End: 2023-07-05

## 2023-07-05 PROCEDURE — 0502F SUBSEQUENT PRENATAL CARE: CPT

## 2023-07-05 PROCEDURE — 76819 FETAL BIOPHYS PROFIL W/O NST: CPT

## 2023-07-11 ENCOUNTER — NON-APPOINTMENT (OUTPATIENT)
Age: 33
End: 2023-07-11

## 2023-07-12 ENCOUNTER — OUTPATIENT (OUTPATIENT)
Dept: OUTPATIENT SERVICES | Facility: HOSPITAL | Age: 33
LOS: 1 days | End: 2023-07-12
Payer: COMMERCIAL

## 2023-07-12 ENCOUNTER — APPOINTMENT (OUTPATIENT)
Dept: OBGYN | Facility: CLINIC | Age: 33
End: 2023-07-12
Payer: COMMERCIAL

## 2023-07-12 VITALS
HEIGHT: 63 IN | DIASTOLIC BLOOD PRESSURE: 81 MMHG | RESPIRATION RATE: 18 BRPM | TEMPERATURE: 98 F | WEIGHT: 171.96 LBS | SYSTOLIC BLOOD PRESSURE: 119 MMHG | HEART RATE: 76 BPM | OXYGEN SATURATION: 97 %

## 2023-07-12 DIAGNOSIS — Z34.90 ENCOUNTER FOR SUPERVISION OF NORMAL PREGNANCY, UNSPECIFIED, UNSPECIFIED TRIMESTER: ICD-10-CM

## 2023-07-12 DIAGNOSIS — O09.93 SUPERVISION OF HIGH RISK PREGNANCY, UNSPECIFIED, THIRD TRIMESTER: ICD-10-CM

## 2023-07-12 DIAGNOSIS — Z98.890 OTHER SPECIFIED POSTPROCEDURAL STATES: Chronic | ICD-10-CM

## 2023-07-12 DIAGNOSIS — O34.30 MATERNAL CARE FOR CERVICAL INCOMPETENCE, UNSPECIFIED TRIMESTER: ICD-10-CM

## 2023-07-12 DIAGNOSIS — O34.30 MATERNAL CARE FOR CERVICAL INCOMPETENCE, UNSPECIFIED TRIMESTER: Chronic | ICD-10-CM

## 2023-07-12 DIAGNOSIS — Z36.85 ENCOUNTER FOR ANTENATAL SCREENING FOR STREPTOCOCCUS B: ICD-10-CM

## 2023-07-12 DIAGNOSIS — Z01.818 ENCOUNTER FOR OTHER PREPROCEDURAL EXAMINATION: ICD-10-CM

## 2023-07-12 PROCEDURE — 86901 BLOOD TYPING SEROLOGIC RH(D): CPT

## 2023-07-12 PROCEDURE — 86850 RBC ANTIBODY SCREEN: CPT

## 2023-07-12 PROCEDURE — 85027 COMPLETE CBC AUTOMATED: CPT

## 2023-07-12 PROCEDURE — 86900 BLOOD TYPING SEROLOGIC ABO: CPT

## 2023-07-12 PROCEDURE — 0502F SUBSEQUENT PRENATAL CARE: CPT

## 2023-07-12 PROCEDURE — G0463: CPT

## 2023-07-12 RX ORDER — CITRIC ACID/SODIUM CITRATE 300-500 MG
15 SOLUTION, ORAL ORAL ONCE
Refills: 0 | Status: DISCONTINUED | OUTPATIENT
Start: 2023-07-27 | End: 2023-07-27

## 2023-07-12 RX ORDER — IBUPROFEN 200 MG
1 TABLET ORAL
Qty: 0 | Refills: 0 | DISCHARGE

## 2023-07-12 RX ORDER — FAMOTIDINE 10 MG/ML
20 INJECTION INTRAVENOUS ONCE
Refills: 0 | Status: DISCONTINUED | OUTPATIENT
Start: 2023-07-27 | End: 2023-07-27

## 2023-07-12 RX ORDER — ACETAMINOPHEN 500 MG
2 TABLET ORAL
Qty: 0 | Refills: 0 | DISCHARGE

## 2023-07-12 RX ORDER — CHLORHEXIDINE GLUCONATE 213 G/1000ML
1 SOLUTION TOPICAL ONCE
Refills: 0 | Status: DISCONTINUED | OUTPATIENT
Start: 2023-07-27 | End: 2023-07-29

## 2023-07-12 RX ORDER — OXYTOCIN 10 UNIT/ML
333.33 VIAL (ML) INJECTION
Qty: 20 | Refills: 0 | Status: DISCONTINUED | OUTPATIENT
Start: 2023-07-27 | End: 2023-07-29

## 2023-07-12 RX ORDER — SODIUM CHLORIDE 9 MG/ML
1000 INJECTION, SOLUTION INTRAVENOUS
Refills: 0 | Status: DISCONTINUED | OUTPATIENT
Start: 2023-07-27 | End: 2023-07-27

## 2023-07-12 RX ORDER — CEFAZOLIN SODIUM 1 G
2000 VIAL (EA) INJECTION ONCE
Refills: 0 | Status: COMPLETED | OUTPATIENT
Start: 2023-07-27 | End: 2023-07-27

## 2023-07-12 RX ORDER — SODIUM CHLORIDE 9 MG/ML
1000 INJECTION, SOLUTION INTRAVENOUS ONCE
Refills: 0 | Status: DISCONTINUED | OUTPATIENT
Start: 2023-07-27 | End: 2023-07-27

## 2023-07-12 NOTE — OB PST NOTE - HISTORY OF PRESENT ILLNESS
33yr old female  EDC/15/2023 coming in for primary . Pt has cerclage in place. Pt denies hx of HTN or diabetes. Feels well no complaints. Pt conceived naturally.

## 2023-07-12 NOTE — OB PST NOTE - NSICDXPASTMEDICALHX_GEN_ALL_CORE_FT
PAST MEDICAL HISTORY:  Chlamydia 2017    Hemoglobin C trait     Incompetent cervix      labor SAB 19 wks 2020 and at 17 5/7 weeks 2020    Termination of pregnancy (fetus) x 2

## 2023-07-12 NOTE — OB PST NOTE - NSICDXPASTSURGICALHX_GEN_ALL_CORE_FT
PAST SURGICAL HISTORY:  Cervical cerclage suture present placed 10/13/20, removed 11/2020    History of D&C 2009, 2011 - D&C

## 2023-07-12 NOTE — OB PST NOTE - ASSESSMENT
NELLIEI VTE 2.0 SCORE [CLOT updated 2019]    AGE RELATED RISK FACTORS                                                       MOBILITY RELATED FACTORS  [ ] Age 41-60 years                                            (1 Point)                    [ ] Bed rest                                                        (1 Point)  [ ] Age: 61-74 years                                           (2 Points)                  [ ] Plaster cast                                                   (2 Points)  [ ] Age= 75 years                                              (3 Points)                    [ ] Bed bound for more than 72 hours                 (2 Points)    DISEASE RELATED RISK FACTORS                                               GENDER SPECIFIC FACTORS  [ ] Edema in the lower extremities                       (1 Point)              x[ ] Pregnancy                                                     (1 Point)  [ ] Varicose veins                                               (1 Point)                     [ ] Post-partum < 6 weeks                                   (1 Point)             [ x] BMI > 25 Kg/m2                                            (1 Point)                     [ ] Hormonal therapy  or oral contraception          (1 Point)                 [ ] Sepsis (in the previous month)                        (1 Point)               [x ] History of pregnancy complications                 (1 point)  [ ] Pneumonia or serious lung disease                                               [ Unexplained or recurrent                     (1 Point)           (in the previous month)                               (1 Point)  [ ] Abnormal pulmonary function test                     (1 Point)                 SURGERY RELATED RISK FACTORS  [ ] Acute myocardial infarction                              (1 Point)               [x ]  Section                                             (1 Point)  [ ] Congestive heart failure (in the previous month)  (1 Point)      [ ] Minor surgery                                                  (1 Point)   [ ] Inflammatory bowel disease                             (1 Point)               [ ] Arthroscopic surgery                                        (2 Points)  [ ] Central venous access                                      (2 Points)                [ ] General surgery lasting more than 45 minutes (2 points)  [ ] Malignancy- Present or previous                   (2 Points)                [ ] Elective arthroplasty                                         (5 points)    [ ] Stroke (in the previous month)                          (5 Points)                                                                                                                                                           HEMATOLOGY RELATED FACTORS                                                 TRAUMA RELATED RISK FACTORS  [ ] Prior episodes of VTE                                     (3 Points)                [ ] Fracture of the hip, pelvis, or leg                       (5 Points)  [ ] Positive family history for VTE                         (3 Points)             [ ] Acute spinal cord injury (in the previous month)  (5 Points)  [ ] Prothrombin 62875 A                                     (3 Points)               [ ] Paralysis  (less than 1 month)                             (5 Points)  [ ] Factor V Leiden                                             (3 Points)                  [ ] Multiple Trauma within 1 month                        (5 Points)  [ ] Lupus anticoagulants                                     (3 Points)                                                           [ ] Anticardiolipin antibodies                               (3 Points)                                                       [ ] High homocysteine in the blood                      (3 Points)                                             [ ] Other congenital or acquired thrombophilia      (3 Points)                                                [ ] Heparin induced thrombocytopenia                  (3 Points)                                     Total Score [    4      ]

## 2023-07-14 ENCOUNTER — NON-APPOINTMENT (OUTPATIENT)
Age: 33
End: 2023-07-14

## 2023-07-14 LAB
GP B STREP DNA SPEC QL NAA+PROBE: NOT DETECTED
SOURCE GBS: NORMAL

## 2023-07-21 ENCOUNTER — APPOINTMENT (OUTPATIENT)
Dept: OBGYN | Facility: CLINIC | Age: 33
End: 2023-07-21
Payer: COMMERCIAL

## 2023-07-21 PROCEDURE — 0502F SUBSEQUENT PRENATAL CARE: CPT

## 2023-07-21 PROCEDURE — 59426 ANTEPARTUM CARE ONLY: CPT

## 2023-07-26 ENCOUNTER — TRANSCRIPTION ENCOUNTER (OUTPATIENT)
Age: 33
End: 2023-07-26

## 2023-07-27 ENCOUNTER — NON-APPOINTMENT (OUTPATIENT)
Age: 33
End: 2023-07-27

## 2023-07-27 ENCOUNTER — INPATIENT (INPATIENT)
Facility: HOSPITAL | Age: 33
LOS: 1 days | Discharge: HOME | End: 2023-07-29
Attending: OBSTETRICS & GYNECOLOGY | Admitting: OBSTETRICS & GYNECOLOGY
Payer: COMMERCIAL

## 2023-07-27 ENCOUNTER — APPOINTMENT (OUTPATIENT)
Dept: OBGYN | Facility: HOSPITAL | Age: 33
End: 2023-07-27

## 2023-07-27 VITALS — HEART RATE: 82 BPM | DIASTOLIC BLOOD PRESSURE: 87 MMHG | SYSTOLIC BLOOD PRESSURE: 141 MMHG

## 2023-07-27 DIAGNOSIS — Z98.890 OTHER SPECIFIED POSTPROCEDURAL STATES: Chronic | ICD-10-CM

## 2023-07-27 DIAGNOSIS — O34.30 MATERNAL CARE FOR CERVICAL INCOMPETENCE, UNSPECIFIED TRIMESTER: ICD-10-CM

## 2023-07-27 DIAGNOSIS — O09.93 SUPERVISION OF HIGH RISK PREGNANCY, UNSPECIFIED, THIRD TRIMESTER: ICD-10-CM

## 2023-07-27 DIAGNOSIS — O34.30 MATERNAL CARE FOR CERVICAL INCOMPETENCE, UNSPECIFIED TRIMESTER: Chronic | ICD-10-CM

## 2023-07-27 LAB
ALBUMIN SERPL ELPH-MCNC: 3.4 G/DL — SIGNIFICANT CHANGE UP (ref 3.3–5)
ALP SERPL-CCNC: 176 U/L — HIGH (ref 40–120)
ALT FLD-CCNC: 9 U/L — LOW (ref 10–45)
ANION GAP SERPL CALC-SCNC: 13 MMOL/L — SIGNIFICANT CHANGE UP (ref 5–17)
APPEARANCE UR: CLEAR — SIGNIFICANT CHANGE UP
APTT BLD: 25.7 SEC — SIGNIFICANT CHANGE UP (ref 24.5–35.6)
AST SERPL-CCNC: 12 U/L — SIGNIFICANT CHANGE UP (ref 10–40)
BASOPHILS # BLD AUTO: 0.1 K/UL — SIGNIFICANT CHANGE UP (ref 0–0.2)
BASOPHILS NFR BLD AUTO: 0.9 % — SIGNIFICANT CHANGE UP (ref 0–2)
BILIRUB SERPL-MCNC: 0.3 MG/DL — SIGNIFICANT CHANGE UP (ref 0.2–1.2)
BILIRUB UR-MCNC: NEGATIVE — SIGNIFICANT CHANGE UP
BUN SERPL-MCNC: 7 MG/DL — SIGNIFICANT CHANGE UP (ref 7–23)
CALCIUM SERPL-MCNC: 9.3 MG/DL — SIGNIFICANT CHANGE UP (ref 8.4–10.5)
CHLORIDE SERPL-SCNC: 105 MMOL/L — SIGNIFICANT CHANGE UP (ref 96–108)
CO2 SERPL-SCNC: 18 MMOL/L — LOW (ref 22–31)
COLOR SPEC: COLORLESS — SIGNIFICANT CHANGE UP
CREAT ?TM UR-MCNC: 7 MG/DL — SIGNIFICANT CHANGE UP
CREAT SERPL-MCNC: 0.87 MG/DL — SIGNIFICANT CHANGE UP (ref 0.5–1.3)
DACRYOCYTES BLD QL SMEAR: SLIGHT — SIGNIFICANT CHANGE UP
DIFF PNL FLD: NEGATIVE — SIGNIFICANT CHANGE UP
EGFR: 91 ML/MIN/1.73M2 — SIGNIFICANT CHANGE UP
EOSINOPHIL # BLD AUTO: 0.38 K/UL — SIGNIFICANT CHANGE UP (ref 0–0.5)
EOSINOPHIL NFR BLD AUTO: 3.4 % — SIGNIFICANT CHANGE UP (ref 0–6)
FIBRINOGEN PPP-MCNC: 543 MG/DL — HIGH (ref 200–445)
GLUCOSE SERPL-MCNC: 107 MG/DL — HIGH (ref 70–99)
GLUCOSE UR QL: NEGATIVE — SIGNIFICANT CHANGE UP
HBV SURFACE AG SERPL QL IA: SIGNIFICANT CHANGE UP
HCT VFR BLD CALC: 40.7 % — SIGNIFICANT CHANGE UP (ref 34.5–45)
HGB BLD-MCNC: 14 G/DL — SIGNIFICANT CHANGE UP (ref 11.5–15.5)
INR BLD: 0.92 RATIO — SIGNIFICANT CHANGE UP (ref 0.85–1.18)
KETONES UR-MCNC: NEGATIVE — SIGNIFICANT CHANGE UP
LDH SERPL L TO P-CCNC: 177 U/L — SIGNIFICANT CHANGE UP (ref 50–242)
LEUKOCYTE ESTERASE UR-ACNC: NEGATIVE — SIGNIFICANT CHANGE UP
LYMPHOCYTES # BLD AUTO: 2.29 K/UL — SIGNIFICANT CHANGE UP (ref 1–3.3)
LYMPHOCYTES # BLD AUTO: 20.5 % — SIGNIFICANT CHANGE UP (ref 13–44)
MANUAL SMEAR VERIFICATION: SIGNIFICANT CHANGE UP
MCHC RBC-ENTMCNC: 25.6 PG — LOW (ref 27–34)
MCHC RBC-ENTMCNC: 34.4 GM/DL — SIGNIFICANT CHANGE UP (ref 32–36)
MCV RBC AUTO: 74.4 FL — LOW (ref 80–100)
MONOCYTES # BLD AUTO: 0.57 K/UL — SIGNIFICANT CHANGE UP (ref 0–0.9)
MONOCYTES NFR BLD AUTO: 5.1 % — SIGNIFICANT CHANGE UP (ref 2–14)
NEUTROPHILS # BLD AUTO: 7.82 K/UL — HIGH (ref 1.8–7.4)
NEUTROPHILS NFR BLD AUTO: 70.1 % — SIGNIFICANT CHANGE UP (ref 43–77)
NITRITE UR-MCNC: NEGATIVE — SIGNIFICANT CHANGE UP
PH UR: 6.5 — SIGNIFICANT CHANGE UP (ref 5–8)
PLAT MORPH BLD: NORMAL — SIGNIFICANT CHANGE UP
PLATELET # BLD AUTO: 291 K/UL — SIGNIFICANT CHANGE UP (ref 150–400)
POIKILOCYTOSIS BLD QL AUTO: SLIGHT — SIGNIFICANT CHANGE UP
POLYCHROMASIA BLD QL SMEAR: SLIGHT — SIGNIFICANT CHANGE UP
POTASSIUM SERPL-MCNC: 3.7 MMOL/L — SIGNIFICANT CHANGE UP (ref 3.5–5.3)
POTASSIUM SERPL-SCNC: 3.7 MMOL/L — SIGNIFICANT CHANGE UP (ref 3.5–5.3)
PROT ?TM UR-MCNC: <7 MG/DL — SIGNIFICANT CHANGE UP (ref 0–12)
PROT SERPL-MCNC: 6.5 G/DL — SIGNIFICANT CHANGE UP (ref 6–8.3)
PROT UR-MCNC: NEGATIVE — SIGNIFICANT CHANGE UP
PROT/CREAT UR-RTO: <1 RATIO — HIGH (ref 0–0.2)
PROTHROM AB SERPL-ACNC: 10.1 SEC — SIGNIFICANT CHANGE UP (ref 9.5–13)
RBC # BLD: 5.47 M/UL — HIGH (ref 3.8–5.2)
RBC # FLD: 15.2 % — HIGH (ref 10.3–14.5)
RBC BLD AUTO: ABNORMAL
RUBV IGG SER-ACNC: 1.6 INDEX — SIGNIFICANT CHANGE UP
RUBV IGG SER-IMP: POSITIVE — SIGNIFICANT CHANGE UP
SODIUM SERPL-SCNC: 136 MMOL/L — SIGNIFICANT CHANGE UP (ref 135–145)
SP GR SPEC: 1 — LOW (ref 1.01–1.02)
TARGETS BLD QL SMEAR: SLIGHT — SIGNIFICANT CHANGE UP
URATE SERPL-MCNC: 5.3 MG/DL — SIGNIFICANT CHANGE UP (ref 2.5–7)
UROBILINOGEN FLD QL: NEGATIVE — SIGNIFICANT CHANGE UP
WBC # BLD: 11.15 K/UL — HIGH (ref 3.8–10.5)
WBC # FLD AUTO: 11.15 K/UL — HIGH (ref 3.8–10.5)

## 2023-07-27 PROCEDURE — 59515 CESAREAN DELIVERY: CPT

## 2023-07-27 DEVICE — INTERCEED 3 X 4": Type: IMPLANTABLE DEVICE | Status: FUNCTIONAL

## 2023-07-27 DEVICE — SURGICEL FIBRILLAR 2 X 4": Type: IMPLANTABLE DEVICE | Status: FUNCTIONAL

## 2023-07-27 RX ORDER — IBUPROFEN 200 MG
600 TABLET ORAL EVERY 6 HOURS
Refills: 0 | Status: COMPLETED | OUTPATIENT
Start: 2023-07-27 | End: 2024-06-24

## 2023-07-27 RX ORDER — DIPHENHYDRAMINE HCL 50 MG
25 CAPSULE ORAL EVERY 6 HOURS
Refills: 0 | Status: DISCONTINUED | OUTPATIENT
Start: 2023-07-27 | End: 2023-07-29

## 2023-07-27 RX ORDER — NALOXONE HYDROCHLORIDE 4 MG/.1ML
0.1 SPRAY NASAL
Refills: 0 | Status: DISCONTINUED | OUTPATIENT
Start: 2023-07-27 | End: 2023-07-28

## 2023-07-27 RX ORDER — TETANUS TOXOID, REDUCED DIPHTHERIA TOXOID AND ACELLULAR PERTUSSIS VACCINE, ADSORBED 5; 2.5; 8; 8; 2.5 [IU]/.5ML; [IU]/.5ML; UG/.5ML; UG/.5ML; UG/.5ML
0.5 SUSPENSION INTRAMUSCULAR ONCE
Refills: 0 | Status: DISCONTINUED | OUTPATIENT
Start: 2023-07-27 | End: 2023-07-29

## 2023-07-27 RX ORDER — OXYCODONE HYDROCHLORIDE 5 MG/1
5 TABLET ORAL
Refills: 0 | Status: COMPLETED | OUTPATIENT
Start: 2023-07-27 | End: 2023-08-03

## 2023-07-27 RX ORDER — MORPHINE SULFATE 50 MG/1
0.1 CAPSULE, EXTENDED RELEASE ORAL ONCE
Refills: 0 | Status: DISCONTINUED | OUTPATIENT
Start: 2023-07-27 | End: 2023-07-28

## 2023-07-27 RX ORDER — KETOROLAC TROMETHAMINE 30 MG/ML
30 SYRINGE (ML) INJECTION EVERY 6 HOURS
Refills: 0 | Status: COMPLETED | OUTPATIENT
Start: 2023-07-27 | End: 2023-07-28

## 2023-07-27 RX ORDER — SIMETHICONE 80 MG/1
80 TABLET, CHEWABLE ORAL EVERY 4 HOURS
Refills: 0 | Status: DISCONTINUED | OUTPATIENT
Start: 2023-07-27 | End: 2023-07-29

## 2023-07-27 RX ORDER — NALBUPHINE HYDROCHLORIDE 10 MG/ML
2.5 INJECTION, SOLUTION INTRAMUSCULAR; INTRAVENOUS; SUBCUTANEOUS EVERY 6 HOURS
Refills: 0 | Status: DISCONTINUED | OUTPATIENT
Start: 2023-07-27 | End: 2023-07-28

## 2023-07-27 RX ORDER — ACETAMINOPHEN 500 MG
975 TABLET ORAL
Refills: 0 | Status: DISCONTINUED | OUTPATIENT
Start: 2023-07-27 | End: 2023-07-29

## 2023-07-27 RX ORDER — ONDANSETRON 8 MG/1
4 TABLET, FILM COATED ORAL EVERY 6 HOURS
Refills: 0 | Status: DISCONTINUED | OUTPATIENT
Start: 2023-07-27 | End: 2023-07-28

## 2023-07-27 RX ORDER — OXYCODONE HYDROCHLORIDE 5 MG/1
5 TABLET ORAL
Refills: 0 | Status: DISCONTINUED | OUTPATIENT
Start: 2023-07-27 | End: 2023-07-28

## 2023-07-27 RX ORDER — OXYCODONE HYDROCHLORIDE 5 MG/1
5 TABLET ORAL ONCE
Refills: 0 | Status: DISCONTINUED | OUTPATIENT
Start: 2023-07-27 | End: 2023-07-29

## 2023-07-27 RX ORDER — OXYCODONE HYDROCHLORIDE 5 MG/1
10 TABLET ORAL
Refills: 0 | Status: DISCONTINUED | OUTPATIENT
Start: 2023-07-27 | End: 2023-07-28

## 2023-07-27 RX ORDER — MAGNESIUM HYDROXIDE 400 MG/1
30 TABLET, CHEWABLE ORAL
Refills: 0 | Status: DISCONTINUED | OUTPATIENT
Start: 2023-07-27 | End: 2023-07-29

## 2023-07-27 RX ORDER — HEPARIN SODIUM 5000 [USP'U]/ML
5000 INJECTION INTRAVENOUS; SUBCUTANEOUS EVERY 12 HOURS
Refills: 0 | Status: DISCONTINUED | OUTPATIENT
Start: 2023-07-27 | End: 2023-07-29

## 2023-07-27 RX ORDER — BUTORPHANOL TARTRATE 2 MG/ML
0.12 INJECTION, SOLUTION INTRAMUSCULAR; INTRAVENOUS EVERY 6 HOURS
Refills: 0 | Status: DISCONTINUED | OUTPATIENT
Start: 2023-07-27 | End: 2023-07-28

## 2023-07-27 RX ORDER — DEXAMETHASONE 0.5 MG/5ML
4 ELIXIR ORAL EVERY 6 HOURS
Refills: 0 | Status: DISCONTINUED | OUTPATIENT
Start: 2023-07-27 | End: 2023-07-28

## 2023-07-27 RX ORDER — LANOLIN
1 OINTMENT (GRAM) TOPICAL EVERY 6 HOURS
Refills: 0 | Status: DISCONTINUED | OUTPATIENT
Start: 2023-07-27 | End: 2023-07-29

## 2023-07-27 RX ADMIN — Medication 30 MILLIGRAM(S): at 18:29

## 2023-07-27 RX ADMIN — Medication 30 MILLIGRAM(S): at 19:00

## 2023-07-27 RX ADMIN — Medication 975 MILLIGRAM(S): at 21:15

## 2023-07-27 RX ADMIN — Medication 975 MILLIGRAM(S): at 20:44

## 2023-07-27 RX ADMIN — HEPARIN SODIUM 5000 UNIT(S): 5000 INJECTION INTRAVENOUS; SUBCUTANEOUS at 18:29

## 2023-07-27 NOTE — OB RN DELIVERY SUMMARY - BABY A: APGAR 1 MIN MUSCLE TONE, DELIVERY
----- Message from Manda Kimble MD sent at 12/26/2018  7:29 PM CST -----  There is NO anemia   (2) well flexed

## 2023-07-27 NOTE — OB PROVIDER DELIVERY SUMMARY - NSPROVIDERDELIVERYNOTE_OBGYN_ALL_OB_FT
pLTCS for abdominal cerclage in situ     Viable female infant. Cephalic presentation. Delivered w/ assistance of MityVac (x1 pull). 2740g APGARS   Grossly normal uterus  Hysterotomy closed in x2 layers w/ 2-0 PDS  Fibrillar placed over hysterotomy     601/1400/400    Dictation #: pLTCS for abdominal cerclage in situ     Viable female infant. Cephalic presentation. Delivered w/ assistance of MityVac (x1 pull). 2740g APGARS   Grossly normal uterus  Hysterotomy closed in x2 layers w/ 2-0 PDS  Fibrillar placed over hysterotomy     601/1400/400    Dictation #: [id number]

## 2023-07-27 NOTE — OB RN DELIVERY SUMMARY - NSSELHIDDEN_OBGYN_ALL_OB_FT
[NS_DeliveryAttending1_OBGYN_ALL_OB_FT:[id number]==],[NS_DeliveryAssist1_OBGYN_ALL_OB_FT:[id number]],[NS_DeliveryRN_OBGYN_ALL_OB_FT:[id number]]

## 2023-07-27 NOTE — OB RN PREOPERATIVE CHECKLIST - DNR CLARIFICATION FORM COMPLETED
Pharmacy Medication History  Admission medication history interview status for the 4/1/2021  admission is complete. See EPIC admission navigator for prior to admission medications     Medication history sources: MAR ([facility])    Medication reconciliation completed by provider prior to medication history? No    Time spent in this activity: 5 minutes     Prior to Admission medications    Medication Sig Last Dose Taking? Auth Provider   acetaminophen (TYLENOL) 500 MG tablet Take 2 tablets (1,000 mg) by mouth daily as needed for mild pain  Yes Lexii Reid NP   aspirin 81 MG chewable tablet Take 1 tablet (81 mg) by mouth daily  Yes Lexii Reid NP   Nutritional Supplements (ENSURE PLUS PO) Take 4 oz by mouth 3 times daily  Yes Unknown, Entered By History   OLANZapine (ZYPREXA) 2.5 MG tablet Take 2.5 mg by mouth At Bedtime  Yes Reported, Patient       The information provided in this note is only as accurate as the sources available at the time of update(s)     Chloe Reilly, BarronD, BCCCP     n/a

## 2023-07-27 NOTE — OB RN PATIENT PROFILE - CURRENT PREGNANCY COMPLICATIONS, OB PROFILE
Incompetent Cervix/Cervical Insufficiency transabdominal cerclage/Incompetent Cervix/Cervical Insufficiency

## 2023-07-27 NOTE — OB RN DELIVERY SUMMARY - NSCORDSECONDSA_OBGYN_A_OB_FT

## 2023-07-27 NOTE — OB RN INTRAOPERATIVE NOTE - NSSELHIDDEN_OBGYN_ALL_OB_FT
[NS_DeliveryAttending1_OBGYN_ALL_OB_FT:[id number]],[NS_DeliveryAssist1_OBGYN_ALL_OB_FT:[id number]],[NS_DeliveryRN_OBGYN_ALL_OB_FT:[id number]]

## 2023-07-27 NOTE — OB PROVIDER H&P - HISTORY OF PRESENT ILLNESS
HPI: Pt is a 33y   @ 37.2wga   GBS pos/neg  EFW:    PNC complicated by     OBHx:  - 2020. PTD @ 17.5wga in the setting of bulging membranes w/ cerclage   - 2020. PTD @ 19wga in the setting of incompetent cervix s/p cerclage   GynHx: Denies any gynecologic issues  PMHx: Denies  PSHx: Denies  Med: PNV  All: NKDA  Psych: Denies hx of mental health issues  SH: Denies hx of smoking, drinking, or drug usage during the pregnancy    Vital Signs Last 24 Hrs  T(C): 36.7 (2023 07:33), Max: 36.7 (2023 07:33)  T(F): 98.1 (2023 07:33), Max: 98.1 (2023 07:33)  HR: 66 (2023 08:04) (66 - 94)  BP: 142/86 (2023 08:04) (141/87 - 159/93)  BP(mean): --  RR: 20 (2023 07:33) (20 - 20)  SpO2: 98% (2023 07:33) (98% - 100%)                 HPI: Pt is a 33y   @ 37.2wga here for pLTCS in the setting of abdominal cerclage   GBS neg  EFW: 6lbs by palpation, 2722g     PNC complicated by prior pregnancy loss at 17.5 and 19wga, see below     OBHx:  - 2020. PTD @ 17.5wga in the setting of bulging membranes w/ cerclage   - 2020. PTD @ 19wga in the setting of incompetent cervix s/p cerclage   - On chart review, ToP x4 with at least x2 D&Cs noted   GynHx: Denies any gynecologic issues  PMHx: Denies  PSHx: Abdominal cerclage as above   Med: PNV, baby ASA  All: NKDA  Psych: Denies hx of mental health issues  SH: Denies hx of smoking, drinking, or drug usage during the pregnancy    Vital Signs Last 24 Hrs  T(C): 36.7 (2023 07:33), Max: 36.7 (2023 07:33)  T(F): 98.1 (2023 07:33), Max: 98.1 (2023 07:33)  HR: 66 (2023 08:04) (66 - 94)  BP: 142/86 (2023 08:04) (141/87 - 159/93)  BP(mean): --  RR: 20 (2023 07:33) (20 - 20)  SpO2: 98% (2023 07:33) (98% - 100%)    Bedside sono: Anterior placenta and vertex

## 2023-07-27 NOTE — OB PROVIDER DELIVERY SUMMARY - NSSELHIDDEN_OBGYN_ALL_OB_FT
[NS_DeliveryAttending1_OBGYN_ALL_OB_FT:[id number]==],[NS_DeliveryAssist1_OBGYN_ALL_OB_FT:[id number]]

## 2023-07-27 NOTE — OB PROVIDER H&P - ASSESSMENT
A/P: Pt is a 33y  @ 37.2wga here for pLTCS in the setting of an abdominal cerclage     1. Admit to LND. HELLP Labs given some elevated BPs on presentation (asx). IVF  2. For pLTCS     Jose Molina, PGY-2  Obstetrics and Gynecology    plan per schedule

## 2023-07-27 NOTE — OB RN DELIVERY SUMMARY - NS_SEPSISRSKCALC_OBGYN_ALL_OB_FT
EOS calculated successfully. EOS Risk Factor: 0.5/1000 live births ([facility] national incidence); GA=37w2d; Temp=98.1; ROM=0.033; GBS='Negative'; Antibiotics='No antibiotics or any antibiotics < 2 hrs prior to birth'

## 2023-07-27 NOTE — OB RN PATIENT PROFILE - THE IMPORTANCE OF THE NEWBORN'S COMFORT AND THERMOREGULATION DURING SKIN TO SKIN: ANY PART OF INFANT SKIN NOT TOUCHING PARENT'S SKIN IS TO BE COVERED BY A BLANKET.
How Severe Are Your Spot(S)?: moderate What Type Of Note Output Would You Prefer (Optional)?: Standard Output What Is The Reason For Today's Visit?: Full Body Skin Examination What Is The Reason For Today's Visit? (Being Monitored For X): concerning skin lesions on an annual basis Statement Selected

## 2023-07-27 NOTE — OB PROVIDER H&P - ATTENDING COMMENTS
I saw and evaluated Ms. Bourgeois and agree with above.   She is well known to me from her prenatal course, prenatal records reviewed.   Consent for  delivery signed and placed on chart, reviewed risks including bleeding, infection, damage to surrounding structures and tissues and she voiced understanding.   All questions were answered.   Jese FIGUEROA

## 2023-07-28 DIAGNOSIS — O13.9 GESTATIONAL [PREGNANCY-INDUCED] HYPERTENSION WITHOUT SIGNIFICANT PROTEINURIA, UNSPECIFIED TRIMESTER: ICD-10-CM

## 2023-07-28 LAB
HCT VFR BLD CALC: 34.7 % — SIGNIFICANT CHANGE UP (ref 34.5–45)
HGB BLD-MCNC: 11.7 G/DL — SIGNIFICANT CHANGE UP (ref 11.5–15.5)
MCHC RBC-ENTMCNC: 25.5 PG — LOW (ref 27–34)
MCHC RBC-ENTMCNC: 33.7 GM/DL — SIGNIFICANT CHANGE UP (ref 32–36)
MCV RBC AUTO: 75.8 FL — LOW (ref 80–100)
NRBC # BLD: 0 /100 WBCS — SIGNIFICANT CHANGE UP (ref 0–0)
PLATELET # BLD AUTO: 256 K/UL — SIGNIFICANT CHANGE UP (ref 150–400)
RBC # BLD: 4.58 M/UL — SIGNIFICANT CHANGE UP (ref 3.8–5.2)
RBC # FLD: 15.3 % — HIGH (ref 10.3–14.5)
WBC # BLD: 16.74 K/UL — HIGH (ref 3.8–10.5)
WBC # FLD AUTO: 16.74 K/UL — HIGH (ref 3.8–10.5)

## 2023-07-28 RX ORDER — IBUPROFEN 200 MG
600 TABLET ORAL EVERY 6 HOURS
Refills: 0 | Status: DISCONTINUED | OUTPATIENT
Start: 2023-07-28 | End: 2023-07-29

## 2023-07-28 RX ADMIN — Medication 975 MILLIGRAM(S): at 04:10

## 2023-07-28 RX ADMIN — HEPARIN SODIUM 5000 UNIT(S): 5000 INJECTION INTRAVENOUS; SUBCUTANEOUS at 18:09

## 2023-07-28 RX ADMIN — Medication 975 MILLIGRAM(S): at 09:30

## 2023-07-28 RX ADMIN — HEPARIN SODIUM 5000 UNIT(S): 5000 INJECTION INTRAVENOUS; SUBCUTANEOUS at 05:57

## 2023-07-28 RX ADMIN — Medication 975 MILLIGRAM(S): at 03:35

## 2023-07-28 RX ADMIN — Medication 975 MILLIGRAM(S): at 21:33

## 2023-07-28 RX ADMIN — Medication 600 MILLIGRAM(S): at 18:45

## 2023-07-28 RX ADMIN — Medication 30 MILLIGRAM(S): at 00:45

## 2023-07-28 RX ADMIN — Medication 975 MILLIGRAM(S): at 09:00

## 2023-07-28 RX ADMIN — Medication 30 MILLIGRAM(S): at 06:30

## 2023-07-28 RX ADMIN — Medication 30 MILLIGRAM(S): at 00:00

## 2023-07-28 RX ADMIN — Medication 600 MILLIGRAM(S): at 18:09

## 2023-07-28 RX ADMIN — Medication 30 MILLIGRAM(S): at 05:57

## 2023-07-28 RX ADMIN — Medication 600 MILLIGRAM(S): at 13:16

## 2023-07-28 RX ADMIN — Medication 975 MILLIGRAM(S): at 22:05

## 2023-07-28 RX ADMIN — Medication 600 MILLIGRAM(S): at 12:46

## 2023-07-28 NOTE — PROGRESS NOTE ADULT - SUBJECTIVE AND OBJECTIVE BOX
Postpartum Note-  Section POD#1    Prenatal Labs  Blood type: O Positive  Rubella IgG: unknown  RPR: Negative          S:Patient w/o complaints, pain is controlled.  Pt is OOB, tolerating PO, passing flatus. Voiding. Lochia WNL.     O:  Vital Signs Last 24 Hrs  T(C): 36.6 (2023 05:36), Max: 36.8 (2023 19:30)  T(F): 97.8 (2023 05:36), Max: 98.2 (2023 19:30)  HR: 80 (2023 05:36) (52 - 88)  BP: 115/75 (2023 05:36) (115/73 - 156/96)  BP(mean): 97 (2023 16:20) (79 - 108)  RR: 18 (2023 05:36) (9 - 32)  SpO2: 98% (2023 05:36) (96% - 100%)    Parameters below as of 2023 05:36  Patient On (Oxygen Delivery Method): room air      I&O's Summary    2023 07:01  -  2023 07:00  --------------------------------------------------------  IN: 2000 mL / OUT: 1650 mL / NET: 350 mL        Gen: NAD  Abdomen: Soft, appropriate tenderness, non-distended, fundus firm.  Incision: Clean/dry/ intact. no erythema, no ecchymosis   Sub Q    Lochia WNL  Ext:Nontender    LABS:             14.0   11.15 )-----------( 291      (  @ 07:35 )             40.7                        Postpartum Note-  Section POD#1    Prenatal Labs  Blood type: O Positive  Rubella IgG: immune  RPR: Negative          S:Patient w/o complaints, pain is controlled.  Pt is OOB, tolerating PO, passing flatus. Voiding. Lochia WNL.     O:  Vital Signs Last 24 Hrs  T(C): 36.6 (2023 05:36), Max: 36.8 (2023 19:30)  T(F): 97.8 (2023 05:36), Max: 98.2 (2023 19:30)  HR: 80 (2023 05:36) (52 - 88)  BP: 115/75 (2023 05:36) (115/73 - 156/96)  BP(mean): 97 (2023 16:20) (79 - 108)  RR: 18 (2023 05:36) (9 - 32)  SpO2: 98% (2023 05:36) (96% - 100%)    Parameters below as of 2023 05:36  Patient On (Oxygen Delivery Method): room air      I&O's Summary    2023 07:01  -  2023 07:00  --------------------------------------------------------  IN: 2000 mL / OUT: 1650 mL / NET: 350 mL        Gen: NAD  Abdomen: Soft, appropriate tenderness, non-distended, fundus firm.  Incision: Clean/dry/ intact. no erythema, no ecchymosis   Sub Q    Lochia WNL  Ext:Nontender    LABS:             14.0   11.15 )-----------( 291      (  @ 07:35 )             40.7

## 2023-07-28 NOTE — PROGRESS NOTE ADULT - SUBJECTIVE AND OBJECTIVE BOX
Day 1 of Anesthesia Pain Management Service    SUBJECTIVE: Doing ok  Pain Scale Score:          [X] Refer to charted pain scores    THERAPY:    s/p   100 mcg PF morphine on 7\27\2023      MEDICATIONS  (STANDING):  acetaminophen     Tablet .. 975 milliGRAM(s) Oral <User Schedule>  chlorhexidine 2% Cloths 1 Application(s) Topical once  diphtheria/tetanus/pertussis (acellular) Vaccine (Adacel) 0.5 milliLiter(s) IntraMuscular once  heparin   Injectable 5000 Unit(s) SubCutaneous every 12 hours  ibuprofen  Tablet. 600 milliGRAM(s) Oral every 6 hours  morphine PF Spinal 0.1 milliGRAM(s) IntraThecal. once  oxytocin Infusion 333.333 milliUNIT(s)/Min (1000 mL/Hr) IV Continuous <Continuous>    MEDICATIONS  (PRN):  butorphanol Injectable 0.125 milliGRAM(s) IV Push every 6 hours PRN Pruritus  dexAMETHasone  Injectable 4 milliGRAM(s) IV Push every 6 hours PRN Nausea  diphenhydrAMINE 25 milliGRAM(s) Oral every 6 hours PRN Pruritus  lanolin Ointment 1 Application(s) Topical every 6 hours PRN Sore Nipples  magnesium hydroxide Suspension 30 milliLiter(s) Oral two times a day PRN Constipation  nalbuphine Injectable 2.5 milliGRAM(s) IV Push every 6 hours PRN Pruritus  naloxone Injectable 0.1 milliGRAM(s) IV Push every 3 minutes PRN For ANY of the following changes in patient status:  A. Breaths Per Minute LESS THAN 10, B. Oxygen saturation LESS THAN 90%, C. Sedation score of 6 for Stop After: 4 Times  ondansetron Injectable 4 milliGRAM(s) IV Push every 6 hours PRN Nausea  oxyCODONE    IR 5 milliGRAM(s) Oral every 3 hours PRN Moderate to Severe Pain (4-10)  oxyCODONE    IR 5 milliGRAM(s) Oral once PRN Moderate to Severe Pain (4-10)  oxyCODONE    IR 5 milliGRAM(s) Oral every 3 hours PRN Mild Pain (1 - 3)  oxyCODONE    IR 10 milliGRAM(s) Oral every 3 hours PRN Moderate Pain (4 - 6)  simethicone 80 milliGRAM(s) Chew every 4 hours PRN Gas      OBJECTIVE:    Sedation:        	[X] Alert	 [ ] Drowsy	[ ] Arousable      [ ] Asleep       [ ] Unresponsive    Side Effects:	[X] None 	[ ] Nausea	[ ] Vomiting         [ ] Pruritus  		[ ] Weakness            [ ] Numbness	          [ ] Other:    Vital Signs Last 24 Hrs  T(C): 36.6 (28 Jul 2023 05:36), Max: 36.8 (27 Jul 2023 19:30)  T(F): 97.8 (28 Jul 2023 05:36), Max: 98.2 (27 Jul 2023 19:30)  HR: 80 (28 Jul 2023 05:36) (52 - 84)  BP: 115/75 (28 Jul 2023 05:36) (115/73 - 144/84)  BP(mean): 97 (27 Jul 2023 16:20) (79 - 108)  RR: 18 (28 Jul 2023 05:36) (9 - 32)  SpO2: 98% (28 Jul 2023 05:36) (96% - 100%)    Parameters below as of 28 Jul 2023 05:36  Patient On (Oxygen Delivery Method): room air        ASSESSMENT/ PLAN  [X] Patient transitioned to prn analgesics  [X] Pain management per primary service, pain service to sign off   [X]Documentation and Verification of current medications

## 2023-07-29 ENCOUNTER — TRANSCRIPTION ENCOUNTER (OUTPATIENT)
Age: 33
End: 2023-07-29

## 2023-07-29 VITALS
TEMPERATURE: 99 F | RESPIRATION RATE: 18 BRPM | SYSTOLIC BLOOD PRESSURE: 129 MMHG | HEART RATE: 70 BPM | OXYGEN SATURATION: 100 % | DIASTOLIC BLOOD PRESSURE: 78 MMHG

## 2023-07-29 PROCEDURE — 86769 SARS-COV-2 COVID-19 ANTIBODY: CPT

## 2023-07-29 PROCEDURE — 85384 FIBRINOGEN ACTIVITY: CPT

## 2023-07-29 PROCEDURE — 85730 THROMBOPLASTIN TIME PARTIAL: CPT

## 2023-07-29 PROCEDURE — 84550 ASSAY OF BLOOD/URIC ACID: CPT

## 2023-07-29 PROCEDURE — 85025 COMPLETE CBC W/AUTO DIFF WBC: CPT

## 2023-07-29 PROCEDURE — 82570 ASSAY OF URINE CREATININE: CPT

## 2023-07-29 PROCEDURE — 86901 BLOOD TYPING SEROLOGIC RH(D): CPT

## 2023-07-29 PROCEDURE — 81003 URINALYSIS AUTO W/O SCOPE: CPT

## 2023-07-29 PROCEDURE — 86780 TREPONEMA PALLIDUM: CPT

## 2023-07-29 PROCEDURE — C1765: CPT

## 2023-07-29 PROCEDURE — 85610 PROTHROMBIN TIME: CPT

## 2023-07-29 PROCEDURE — 80053 COMPREHEN METABOLIC PANEL: CPT

## 2023-07-29 PROCEDURE — 86900 BLOOD TYPING SEROLOGIC ABO: CPT

## 2023-07-29 PROCEDURE — C1889: CPT

## 2023-07-29 PROCEDURE — 59050 FETAL MONITOR W/REPORT: CPT

## 2023-07-29 PROCEDURE — 83615 LACTATE (LD) (LDH) ENZYME: CPT

## 2023-07-29 PROCEDURE — 86762 RUBELLA ANTIBODY: CPT

## 2023-07-29 PROCEDURE — 86850 RBC ANTIBODY SCREEN: CPT

## 2023-07-29 PROCEDURE — 84156 ASSAY OF PROTEIN URINE: CPT

## 2023-07-29 PROCEDURE — 87340 HEPATITIS B SURFACE AG IA: CPT

## 2023-07-29 PROCEDURE — 85027 COMPLETE CBC AUTOMATED: CPT

## 2023-07-29 PROCEDURE — 59025 FETAL NON-STRESS TEST: CPT

## 2023-07-29 PROCEDURE — 36415 COLL VENOUS BLD VENIPUNCTURE: CPT

## 2023-07-29 RX ORDER — OXYCODONE HYDROCHLORIDE 5 MG/1
5 TABLET ORAL
Refills: 0 | Status: DISCONTINUED | OUTPATIENT
Start: 2023-07-29 | End: 2023-07-29

## 2023-07-29 RX ORDER — OXYCODONE HYDROCHLORIDE 5 MG/1
1 TABLET ORAL
Qty: 0 | Refills: 0 | DISCHARGE
Start: 2023-07-29

## 2023-07-29 RX ORDER — IBUPROFEN 200 MG
1 TABLET ORAL
Qty: 0 | Refills: 0 | DISCHARGE
Start: 2023-07-29

## 2023-07-29 RX ORDER — LANOLIN
1 OINTMENT (GRAM) TOPICAL
Qty: 0 | Refills: 0 | DISCHARGE
Start: 2023-07-29

## 2023-07-29 RX ORDER — ACETAMINOPHEN 500 MG
3 TABLET ORAL
Qty: 0 | Refills: 0 | DISCHARGE
Start: 2023-07-29

## 2023-07-29 RX ORDER — OXYCODONE HYDROCHLORIDE 5 MG/1
1 TABLET ORAL
Qty: 5 | Refills: 0
Start: 2023-07-29

## 2023-07-29 RX ADMIN — Medication 600 MILLIGRAM(S): at 07:00

## 2023-07-29 RX ADMIN — Medication 975 MILLIGRAM(S): at 09:04

## 2023-07-29 RX ADMIN — Medication 600 MILLIGRAM(S): at 11:55

## 2023-07-29 RX ADMIN — Medication 975 MILLIGRAM(S): at 03:00

## 2023-07-29 RX ADMIN — Medication 975 MILLIGRAM(S): at 10:00

## 2023-07-29 RX ADMIN — Medication 600 MILLIGRAM(S): at 12:50

## 2023-07-29 RX ADMIN — Medication 600 MILLIGRAM(S): at 00:01

## 2023-07-29 RX ADMIN — Medication 600 MILLIGRAM(S): at 00:31

## 2023-07-29 RX ADMIN — Medication 975 MILLIGRAM(S): at 02:30

## 2023-07-29 RX ADMIN — Medication 600 MILLIGRAM(S): at 05:28

## 2023-07-29 RX ADMIN — HEPARIN SODIUM 5000 UNIT(S): 5000 INJECTION INTRAVENOUS; SUBCUTANEOUS at 05:28

## 2023-07-29 NOTE — DISCHARGE NOTE OB - NS MD DC FALL RISK RISK
For information on Fall & Injury Prevention, visit: [email or web address] OR  [email or web address] OR  https://www.cdc.gov/steadi/patient.html

## 2023-07-29 NOTE — DISCHARGE NOTE OB - NS AS DC PROVIDER CONTACT Y/N MULTI
Patient in bed resting quietly. Patient respirations even and unlabored on 15L hi flow NC. Patient denies needs at this time. No overnight events. Patient given imodium 4mg PO capsule for diarrhea.  Report given to [facility] Yes

## 2023-07-29 NOTE — DISCHARGE NOTE OB - MEDICATION SUMMARY - MEDICATIONS TO TAKE
I will START or STAY ON the medications listed below when I get home from the hospital:    ibuprofen 600 mg oral tablet  -- 1 tab(s) by mouth every 6 hours  -- Indication: For pain    acetaminophen 325 mg oral tablet  -- 3 tab(s) by mouth 3 times a day  -- Indication: For pain    oxyCODONE 5 mg oral tablet  -- 1 tab(s) by mouth once As needed Moderate to Severe Pain (4-10)  -- Indication: For pain    lanolin topical ointment  -- 1 Apply on skin to affected area every 6 hours As needed Sore Nipples  -- Indication: For nipple soreness

## 2023-07-29 NOTE — DISCHARGE NOTE OB - CARE PLAN
1 Principal Discharge DX:	 delivery delivered  Assessment and plan of treatment:	incision check in 2 weeks, pelvic rest x 6 weeks.  Secondary Diagnosis:	 delivery delivered

## 2023-07-29 NOTE — PROGRESS NOTE ADULT - ASSESSMENT
A/P:  33y  POD # 2 S/P  scheduled primary  section for h/o abdominal cerclage  Doing well
A/P:  33y  POD # 1 S/P scheduled primary  section in the setting of abdominal cerclage  Doing well

## 2023-07-29 NOTE — DISCHARGE NOTE OB - PATIENT PORTAL LINK FT
You can access the FollowMyHealth Patient Portal offered by [facility] by registering at the following website: [email or web address]. By joining [facility]’s FollowMyHealth portal, you will also be able to view your health information using other applications (apps) compatible with our system.

## 2023-07-29 NOTE — PROGRESS NOTE ADULT - ATTENDING COMMENTS
OB Attg:  Pt seen and assessed. I agree with above assessment and plan. Discharge planning.    JOE Cardoza MD

## 2023-07-29 NOTE — PROGRESS NOTE ADULT - SUBJECTIVE AND OBJECTIVE BOX
Postpartum Note-  Section POD#2    Prenatal Labs  Blood type: O Positive  Rubella IgG: Immune  RPR: Negative          S: Patient w/o complaints, pain is controlled.  Pt is OOB, tolerating PO, passing flatus, and voiding. Lochia WNL.     O:  Vital Signs Last 24 Hrs  T(C): 37 (2023 05:41), Max: 37 (2023 13:36)  T(F): 98.6 (2023 05:41), Max: 98.6 (2023 13:36)  HR: 70 (2023 05:41) (64 - 84)  BP: 119/73 (2023 05:41) (101/71 - 119/73)  BP(mean): --  RR: 18 (2023 05:41) (17 - 18)  SpO2: 99% (2023 05:41) (95% - 100%)    Parameters below as of 2023 05:41  Patient On (Oxygen Delivery Method): room air        Gen: NAD  Abdomen: Soft, nontender, non distended, fundus firm.  Incision: Clean/dry/ intact. no erythema, no ecchymosis.   SubQ   Lochia WNL  Ext: Neg calf tenderness    LABS:               11.7   16.74 )-----------( 256      (  @ 09:11 )             34.7                14.0   11.15 )-----------( 291      (  @ 07:35 )             40.7

## 2023-07-29 NOTE — DISCHARGE NOTE OB - MATERIALS PROVIDED
Guide to Postpartum Care/Back To Sleep Handout/Breastfeeding Guide and Packet/Birth Certificate Instructions

## 2023-07-29 NOTE — DISCHARGE NOTE OB - CARE PROVIDER_API CALL
Nikolai Cardoza  Obstetrics and Gynecology  [address] [place] [address], [address]  [place] [address]  Phone: [phone]  Fax: [phone]  Follow Up Time:

## 2023-08-10 PROBLEM — D58.2 OTHER HEMOGLOBINOPATHIES: Chronic | Status: ACTIVE | Noted: 2023-07-12

## 2023-08-11 ENCOUNTER — APPOINTMENT (OUTPATIENT)
Dept: OBGYN | Facility: CLINIC | Age: 33
End: 2023-08-11
Payer: COMMERCIAL

## 2023-08-11 VITALS
WEIGHT: 156 LBS | OXYGEN SATURATION: 98 % | HEART RATE: 76 BPM | SYSTOLIC BLOOD PRESSURE: 116 MMHG | BODY MASS INDEX: 27.64 KG/M2 | HEIGHT: 63 IN | DIASTOLIC BLOOD PRESSURE: 76 MMHG

## 2023-08-11 DIAGNOSIS — O09.93 SUPERVISION OF HIGH RISK PREGNANCY, UNSPECIFIED, THIRD TRIMESTER: ICD-10-CM

## 2023-08-11 PROCEDURE — 0503F POSTPARTUM CARE VISIT: CPT

## 2023-08-13 NOTE — HISTORY OF PRESENT ILLNESS
[Delivery Date: ___] : on [unfilled] [Primary C/S] : delivered by  section [Female] : Delivery History: baby girl [Wt. ___] : weighing [unfilled] [Breastfeeding] : currently nursing [Postpartum Follow Up] : postpartum follow up [Complications:___] : complications include: [unfilled] [Low Transverse  Section] : low transverse  section [Rhogam] : Rhogam was not administered [BTL] : no tubal ligation [Living at Home] : is currently living at home [BF with Difficulty] : nursing without difficulty [Resumed Menses] : has not resumed her menses [Resumed [place]] : has not resumed intercourse [Abdominal Pain] : no abdominal pain [Back Pain] : no back pain [Breast Pain] : no breast pain [BreastFeeding Problems] : no breastfeeding problems [Chest Pain] : no chest pain [Cracked Nipples] : no cracked nipples [S/Sx PP Depression] : no signs/symptoms of postpartum depression [Heavy Bleeding] : no heavy bleeding [Incisional Drainage] : no incisional drainage [Incisional Pain] : no incisional pain [Irregular Bleeding] : no irregular bleeding [Leg Pain] : no leg pain [Shortness of Breath] : no shortness of breath [Suicidal Ideation] : no suicidal ideation [Vaginal Discharge] : no vaginal discharge [None] : No associated symptoms are reported [Clean/Dry/Intact] : clean, dry and intact [Erythema] : not erythematous [Swelling] : not swollen [Dehiscence] : not dehisced [Examination Of The Breasts] : breasts are normal [Doing Well] : is doing well [No Sign of Infection] : is showing no signs of infection [Excellent Pain Control] : has excellent pain control [No [place]] : to avoid sexual intercourse [Limited ADLs] : to participate in activities of daily living with limitations [No Work] : not to work [No Sports] : not to participate in sports [de-identified] : O pos.  [de-identified] : Doing well, excellent pain control.  [de-identified] : Incision with dermabond in place, no skin dehiscence or drainage.  [de-identified] : RTO 5 - 6 weeks for complete postpartum exam. She understands that we are available should any complications arise in the interim.

## 2023-09-13 ENCOUNTER — APPOINTMENT (OUTPATIENT)
Dept: OBGYN | Facility: CLINIC | Age: 33
End: 2023-09-13
Payer: COMMERCIAL

## 2023-09-13 ENCOUNTER — NON-APPOINTMENT (OUTPATIENT)
Age: 33
End: 2023-09-13

## 2023-09-13 VITALS
SYSTOLIC BLOOD PRESSURE: 112 MMHG | HEIGHT: 63 IN | BODY MASS INDEX: 28 KG/M2 | WEIGHT: 158 LBS | DIASTOLIC BLOOD PRESSURE: 72 MMHG

## 2023-09-13 DIAGNOSIS — O34.30 MATERNAL CARE FOR CERVICAL INCOMPETENCE, UNSPECIFIED TRIMESTER: ICD-10-CM

## 2023-09-13 PROCEDURE — 0503F POSTPARTUM CARE VISIT: CPT

## 2023-10-13 NOTE — ED ADULT TRIAGE NOTE - HEIGHT IN CM
Continue breathing treatments as currently  Please consider discontinuing smoking cigarettes  Hydroxyzine as currently prescribed  Return immediately if worse or any new symptoms 160.02

## 2023-11-22 ENCOUNTER — APPOINTMENT (OUTPATIENT)
Dept: OBGYN | Facility: CLINIC | Age: 33
End: 2023-11-22

## 2023-12-06 ENCOUNTER — NON-APPOINTMENT (OUTPATIENT)
Age: 33
End: 2023-12-06

## 2023-12-06 ENCOUNTER — APPOINTMENT (OUTPATIENT)
Dept: FAMILY MEDICINE | Facility: CLINIC | Age: 33
End: 2023-12-06
Payer: COMMERCIAL

## 2023-12-06 VITALS
TEMPERATURE: 98.2 F | HEART RATE: 78 BPM | WEIGHT: 160 LBS | BODY MASS INDEX: 28.35 KG/M2 | OXYGEN SATURATION: 98 % | DIASTOLIC BLOOD PRESSURE: 65 MMHG | HEIGHT: 63 IN | RESPIRATION RATE: 16 BRPM | SYSTOLIC BLOOD PRESSURE: 118 MMHG

## 2023-12-06 DIAGNOSIS — Z00.00 ENCOUNTER FOR GENERAL ADULT MEDICAL EXAMINATION W/OUT ABNORMAL FINDINGS: ICD-10-CM

## 2023-12-06 PROCEDURE — 36415 COLL VENOUS BLD VENIPUNCTURE: CPT

## 2023-12-06 PROCEDURE — 99385 PREV VISIT NEW AGE 18-39: CPT | Mod: 25

## 2023-12-08 ENCOUNTER — TRANSCRIPTION ENCOUNTER (OUTPATIENT)
Age: 33
End: 2023-12-08

## 2023-12-08 LAB
25(OH)D3 SERPL-MCNC: 32.2 NG/ML
ALBUMIN SERPL ELPH-MCNC: 4.5 G/DL
ALP BLD-CCNC: 101 U/L
ALT SERPL-CCNC: 13 U/L
ANION GAP SERPL CALC-SCNC: 15 MMOL/L
AST SERPL-CCNC: 12 U/L
BASOPHILS # BLD AUTO: 0.04 K/UL
BASOPHILS NFR BLD AUTO: 0.5 %
BILIRUB SERPL-MCNC: 0.2 MG/DL
BUN SERPL-MCNC: 10 MG/DL
CALCIUM SERPL-MCNC: 10.7 MG/DL
CHLORIDE SERPL-SCNC: 102 MMOL/L
CHOLEST SERPL-MCNC: 169 MG/DL
CO2 SERPL-SCNC: 23 MMOL/L
CREAT SERPL-MCNC: 0.9 MG/DL
EGFR: 88 ML/MIN/1.73M2
EOSINOPHIL # BLD AUTO: 0.15 K/UL
EOSINOPHIL NFR BLD AUTO: 1.9 %
ESTIMATED AVERAGE GLUCOSE: 103 MG/DL
FOLATE SERPL-MCNC: >20 NG/ML
GLUCOSE SERPL-MCNC: 82 MG/DL
HBA1C MFR BLD HPLC: 5.2 %
HCT VFR BLD CALC: 43.3 %
HDLC SERPL-MCNC: 51 MG/DL
HGB BLD-MCNC: 14.3 G/DL
IMM GRANULOCYTES NFR BLD AUTO: 0.3 %
LDLC SERPL CALC-MCNC: 94 MG/DL
LYMPHOCYTES # BLD AUTO: 2.25 K/UL
LYMPHOCYTES NFR BLD AUTO: 28.4 %
MAGNESIUM SERPL-MCNC: 2.1 MG/DL
MAN DIFF?: NORMAL
MCHC RBC-ENTMCNC: 25.8 PG
MCHC RBC-ENTMCNC: 33 GM/DL
MCV RBC AUTO: 78 FL
MONOCYTES # BLD AUTO: 0.77 K/UL
MONOCYTES NFR BLD AUTO: 9.7 %
NEUTROPHILS # BLD AUTO: 4.68 K/UL
NEUTROPHILS NFR BLD AUTO: 59.2 %
NONHDLC SERPL-MCNC: 119 MG/DL
PLATELET # BLD AUTO: 384 K/UL
POTASSIUM SERPL-SCNC: 5 MMOL/L
PROT SERPL-MCNC: 7.4 G/DL
RBC # BLD: 5.55 M/UL
RBC # FLD: 16.3 %
SODIUM SERPL-SCNC: 140 MMOL/L
TRIGL SERPL-MCNC: 138 MG/DL
TSH SERPL-ACNC: 1.38 UIU/ML
VIT B12 SERPL-MCNC: 464 PG/ML
WBC # FLD AUTO: 7.91 K/UL

## 2024-01-08 ENCOUNTER — TRANSCRIPTION ENCOUNTER (OUTPATIENT)
Age: 34
End: 2024-01-08

## 2024-01-12 DIAGNOSIS — M79.673 PAIN IN UNSPECIFIED FOOT: ICD-10-CM

## 2024-01-17 ENCOUNTER — TRANSCRIPTION ENCOUNTER (OUTPATIENT)
Age: 34
End: 2024-01-17

## 2024-03-14 NOTE — OB RN PATIENT PROFILE - FALL HARM RISK - UNIVERSAL INTERVENTIONS
Yes Bed in lowest position, wheels locked, appropriate side rails in place/Call bell, personal items and telephone in reach/Instruct patient to call for assistance before getting out of bed or chair/Non-slip footwear when patient is out of bed/[place] to call system/Physically safe environment - no spills, clutter or unnecessary equipment/Purposeful Proactive Rounding/Room/bathroom lighting operational, light cord in reach

## 2024-03-20 NOTE — ASU DISCHARGE PLAN (ADULT/PEDIATRIC) - CALL YOUR DOCTOR IF YOU HAVE ANY OF THE FOLLOWING:
Yes
Bleeding that does not stop/Swelling that gets worse/Pain not relieved by Medications/Fever greater than (need to indicate Fahrenheit or Celsius)/Wound/Surgical Site with redness, or foul smelling discharge or pus/Numbness, tingling, color or temperature change to extremity/Nausea and vomiting that does not stop/Unable to urinate/Excessive diarrhea/Inability to tolerate liquids or foods/Increased irritability or sluggishness

## 2024-09-20 ENCOUNTER — NON-APPOINTMENT (OUTPATIENT)
Age: 34
End: 2024-09-20

## 2024-12-18 ENCOUNTER — APPOINTMENT (OUTPATIENT)
Dept: FAMILY MEDICINE | Facility: CLINIC | Age: 34
End: 2024-12-18

## 2025-02-06 NOTE — OB RN PATIENT PROFILE - NS PRO AD PATIENT TYPE
Patient comment: Guilherme runs out on 2/8 and we dont aee Dr Adler til 2/12    Health Care Proxy (HCP)

## 2025-02-27 ENCOUNTER — APPOINTMENT (OUTPATIENT)
Dept: FAMILY MEDICINE | Facility: CLINIC | Age: 35
End: 2025-02-27
Payer: COMMERCIAL

## 2025-02-27 VITALS
DIASTOLIC BLOOD PRESSURE: 62 MMHG | BODY MASS INDEX: 27.64 KG/M2 | TEMPERATURE: 98.4 F | HEART RATE: 61 BPM | SYSTOLIC BLOOD PRESSURE: 113 MMHG | WEIGHT: 156 LBS | OXYGEN SATURATION: 99 % | RESPIRATION RATE: 16 BRPM | HEIGHT: 63 IN

## 2025-02-27 DIAGNOSIS — Z00.00 ENCOUNTER FOR GENERAL ADULT MEDICAL EXAMINATION W/OUT ABNORMAL FINDINGS: ICD-10-CM

## 2025-02-27 PROCEDURE — 99395 PREV VISIT EST AGE 18-39: CPT

## 2025-02-27 PROCEDURE — 81003 URINALYSIS AUTO W/O SCOPE: CPT | Mod: QW

## 2025-02-27 PROCEDURE — 36415 COLL VENOUS BLD VENIPUNCTURE: CPT

## 2025-03-14 ENCOUNTER — TRANSCRIPTION ENCOUNTER (OUTPATIENT)
Age: 35
End: 2025-03-14

## 2025-03-14 DIAGNOSIS — E55.9 VITAMIN D DEFICIENCY, UNSPECIFIED: ICD-10-CM

## 2025-03-14 RX ORDER — CHOLECALCIFEROL (VITAMIN D3) 1250 MCG
1.25 MG CAPSULE ORAL
Qty: 12 | Refills: 3 | Status: ACTIVE | COMMUNITY
Start: 2025-03-14 | End: 1900-01-01

## 2025-07-07 NOTE — OB PROVIDER H&P - NS_AMNISURE_OBGYN_ALL_OB
Called pt, lvm  Advised pt to go to urgent care so she can be treated   With out Evaluation from Dr Mallory she can't prescribed any abx.  Dr Mallory is already booked with appt his whole week   At the urgent care they can prescribe abx and do Urine cx.   
Pt is reporting possible UTI. Been having symptoms for one week. Cloudy urine, odd sensation, little discomfort    Please advise Pt, thank you~  
Not Done